# Patient Record
Sex: FEMALE | Race: WHITE | NOT HISPANIC OR LATINO
[De-identification: names, ages, dates, MRNs, and addresses within clinical notes are randomized per-mention and may not be internally consistent; named-entity substitution may affect disease eponyms.]

---

## 2017-09-08 ENCOUNTER — RESULT REVIEW (OUTPATIENT)
Age: 67
End: 2017-09-08

## 2018-04-10 ENCOUNTER — RESULT REVIEW (OUTPATIENT)
Age: 68
End: 2018-04-10

## 2018-07-23 ENCOUNTER — APPOINTMENT (OUTPATIENT)
Dept: GYNECOLOGIC ONCOLOGY | Facility: CLINIC | Age: 68
End: 2018-07-23
Payer: MEDICARE

## 2018-07-23 VITALS
HEIGHT: 63 IN | DIASTOLIC BLOOD PRESSURE: 78 MMHG | SYSTOLIC BLOOD PRESSURE: 122 MMHG | BODY MASS INDEX: 33.66 KG/M2 | WEIGHT: 190 LBS

## 2018-07-23 DIAGNOSIS — N88.2 STRICTURE AND STENOSIS OF CERVIX UTERI: ICD-10-CM

## 2018-07-23 DIAGNOSIS — R93.8 ABNORMAL FINDINGS ON DIAGNOSTIC IMAGING OF OTHER SPECIFIED BODY STRUCTURES: ICD-10-CM

## 2018-07-23 DIAGNOSIS — R73.03 PREDIABETES.: ICD-10-CM

## 2018-07-23 PROCEDURE — 99205 OFFICE O/P NEW HI 60 MIN: CPT

## 2018-07-23 RX ORDER — MAGNESIUM OXIDE 400 MG
400 (241.3 MG) TABLET ORAL
Refills: 0 | Status: ACTIVE | COMMUNITY

## 2018-07-23 RX ORDER — ICOSAPENT ETHYL 1000 MG/1
1 CAPSULE ORAL
Refills: 0 | Status: ACTIVE | COMMUNITY

## 2018-07-29 PROBLEM — N88.2 CERVICAL STENOSIS (UTERINE CERVIX): Status: ACTIVE | Noted: 2018-07-29

## 2018-07-30 ENCOUNTER — OTHER (OUTPATIENT)
Age: 68
End: 2018-07-30

## 2018-08-15 ENCOUNTER — APPOINTMENT (OUTPATIENT)
Dept: ULTRASOUND IMAGING | Facility: HOSPITAL | Age: 68
End: 2018-08-15

## 2018-08-29 ENCOUNTER — OTHER (OUTPATIENT)
Age: 68
End: 2018-08-29

## 2018-09-11 ENCOUNTER — OTHER (OUTPATIENT)
Age: 68
End: 2018-09-11

## 2018-09-12 ENCOUNTER — OTHER (OUTPATIENT)
Age: 68
End: 2018-09-12

## 2018-09-14 ENCOUNTER — OUTPATIENT (OUTPATIENT)
Dept: OUTPATIENT SERVICES | Facility: HOSPITAL | Age: 68
LOS: 1 days | End: 2018-09-14
Payer: MEDICARE

## 2018-09-14 VITALS
SYSTOLIC BLOOD PRESSURE: 119 MMHG | OXYGEN SATURATION: 95 % | HEART RATE: 84 BPM | TEMPERATURE: 99 F | DIASTOLIC BLOOD PRESSURE: 65 MMHG | WEIGHT: 192.9 LBS | HEIGHT: 63 IN | RESPIRATION RATE: 16 BRPM

## 2018-09-14 DIAGNOSIS — Z90.13 ACQUIRED ABSENCE OF BILATERAL BREASTS AND NIPPLES: Chronic | ICD-10-CM

## 2018-09-14 DIAGNOSIS — I10 ESSENTIAL (PRIMARY) HYPERTENSION: ICD-10-CM

## 2018-09-14 DIAGNOSIS — Z98.890 OTHER SPECIFIED POSTPROCEDURAL STATES: Chronic | ICD-10-CM

## 2018-09-14 DIAGNOSIS — N88.2 STRICTURE AND STENOSIS OF CERVIX UTERI: ICD-10-CM

## 2018-09-14 DIAGNOSIS — Z95.0 PRESENCE OF CARDIAC PACEMAKER: ICD-10-CM

## 2018-09-14 DIAGNOSIS — Z01.818 ENCOUNTER FOR OTHER PREPROCEDURAL EXAMINATION: ICD-10-CM

## 2018-09-14 DIAGNOSIS — Z98.891 HISTORY OF UTERINE SCAR FROM PREVIOUS SURGERY: Chronic | ICD-10-CM

## 2018-09-14 DIAGNOSIS — E11.9 TYPE 2 DIABETES MELLITUS WITHOUT COMPLICATIONS: ICD-10-CM

## 2018-09-14 DIAGNOSIS — Z90.722 ACQUIRED ABSENCE OF OVARIES, BILATERAL: Chronic | ICD-10-CM

## 2018-09-14 LAB
ANION GAP SERPL CALC-SCNC: 15 MMOL/L — SIGNIFICANT CHANGE UP (ref 5–17)
BUN SERPL-MCNC: 19 MG/DL — SIGNIFICANT CHANGE UP (ref 7–23)
CALCIUM SERPL-MCNC: 10.1 MG/DL — SIGNIFICANT CHANGE UP (ref 8.4–10.5)
CHLORIDE SERPL-SCNC: 102 MMOL/L — SIGNIFICANT CHANGE UP (ref 96–108)
CO2 SERPL-SCNC: 23 MMOL/L — SIGNIFICANT CHANGE UP (ref 22–31)
CREAT SERPL-MCNC: 0.84 MG/DL — SIGNIFICANT CHANGE UP (ref 0.5–1.3)
GLUCOSE SERPL-MCNC: 113 MG/DL — HIGH (ref 70–99)
HBA1C BLD-MCNC: 6.2 % — HIGH (ref 4–5.6)
HCT VFR BLD CALC: 37 % — SIGNIFICANT CHANGE UP (ref 34.5–45)
HGB BLD-MCNC: 11.7 G/DL — SIGNIFICANT CHANGE UP (ref 11.5–15.5)
MCHC RBC-ENTMCNC: 26.1 PG — LOW (ref 27–34)
MCHC RBC-ENTMCNC: 31.6 GM/DL — LOW (ref 32–36)
MCV RBC AUTO: 82.6 FL — SIGNIFICANT CHANGE UP (ref 80–100)
PLATELET # BLD AUTO: 271 K/UL — SIGNIFICANT CHANGE UP (ref 150–400)
POTASSIUM SERPL-MCNC: 3.9 MMOL/L — SIGNIFICANT CHANGE UP (ref 3.5–5.3)
POTASSIUM SERPL-SCNC: 3.9 MMOL/L — SIGNIFICANT CHANGE UP (ref 3.5–5.3)
RBC # BLD: 4.48 M/UL — SIGNIFICANT CHANGE UP (ref 3.8–5.2)
RBC # FLD: 16.6 % — HIGH (ref 10.3–14.5)
SODIUM SERPL-SCNC: 140 MMOL/L — SIGNIFICANT CHANGE UP (ref 135–145)
WBC # BLD: 10.49 K/UL — SIGNIFICANT CHANGE UP (ref 3.8–10.5)
WBC # FLD AUTO: 10.49 K/UL — SIGNIFICANT CHANGE UP (ref 3.8–10.5)

## 2018-09-14 PROCEDURE — 83036 HEMOGLOBIN GLYCOSYLATED A1C: CPT

## 2018-09-14 PROCEDURE — 85027 COMPLETE CBC AUTOMATED: CPT

## 2018-09-14 PROCEDURE — 80048 BASIC METABOLIC PNL TOTAL CA: CPT

## 2018-09-14 PROCEDURE — G0463: CPT

## 2018-09-14 RX ORDER — LIDOCAINE HCL 20 MG/ML
0.2 VIAL (ML) INJECTION ONCE
Qty: 0 | Refills: 0 | Status: DISCONTINUED | OUTPATIENT
Start: 2018-09-18 | End: 2018-10-03

## 2018-09-14 RX ORDER — SODIUM CHLORIDE 9 MG/ML
3 INJECTION INTRAMUSCULAR; INTRAVENOUS; SUBCUTANEOUS EVERY 8 HOURS
Qty: 0 | Refills: 0 | Status: DISCONTINUED | OUTPATIENT
Start: 2018-09-18 | End: 2018-10-03

## 2018-09-14 NOTE — H&P PST ADULT - PSH
H/O foot surgery  s/p lefy ankle surgery- 2014  H/O mastectomy, bilateral  with TRAM flap reconstruction-2003  H/O:     S/P BSO (bilateral salpingo-oophorectomy)  2004 H/O foot surgery  s/p left ankle surgery- 2014  H/O mastectomy, bilateral  with TRAM flap reconstruction-2003  H/O:     S/P BSO (bilateral salpingo-oophorectomy)  2004

## 2018-09-14 NOTE — H&P PST ADULT - HISTORY OF PRESENT ILLNESS
67 yo female wiitgh 69 yo female with PMH of HTN, breast cancer, cardiac arrythmia (s/p PPM in 01/2011)- T2DM- c/o per vaginal bleeding x 2 months. Had gyn evaluation s/p pelvic sonogram/CT scan abdomen & pelvis revealed stricture & stenosis of cervix uteri- scheduled for D&D, diagnostic hysteroscopy on 09/18/2018 69 yo female with PMH of HTN, breast cancer, cardiac arrythmia (s/p PPM in 01/2011)- T2DM- c/o per vaginal bleeding x 2 months. Had gyn evaluation s/p pelvic sonogram/CT scan abdomen & pelvis revealed stricture & stenosis of cervix uteri- scheduled for D&C, diagnostic hysteroscopy on 09/18/2018

## 2018-09-14 NOTE — H&P PST ADULT - NSANTHOSAYNRD_GEN_A_CORE
No. CESILIA screening performed.  STOP BANG Legend: 0-2 = LOW Risk; 3-4 = INTERMEDIATE Risk; 5-8 = HIGH Risk

## 2018-09-14 NOTE — H&P PST ADULT - PMH
DM (diabetes mellitus)    HTN (hypertension)    Hyperlipidemia, unspecified hyperlipidemia type    Pacemaker  01/19/2011 Breast cancer in female  s/p chemo in 2002  DM (diabetes mellitus)  Type 2 DM  HTN (hypertension)    Hyperlipidemia, unspecified hyperlipidemia type    Pacemaker  01/19/2011-Last interrogation in 07/2018  Stricture and stenosis of cervix uteri

## 2018-09-14 NOTE — H&P PST ADULT - NEGATIVE CARDIOVASCULAR SYMPTOMS
no orthopnea/no peripheral edema/no dyspnea on exertion/no chest pain/no palpitations/no paroxysmal nocturnal dyspnea

## 2018-09-17 ENCOUNTER — TRANSCRIPTION ENCOUNTER (OUTPATIENT)
Age: 68
End: 2018-09-17

## 2018-09-18 ENCOUNTER — RESULT REVIEW (OUTPATIENT)
Age: 68
End: 2018-09-18

## 2018-09-18 ENCOUNTER — OUTPATIENT (OUTPATIENT)
Dept: OUTPATIENT SERVICES | Facility: HOSPITAL | Age: 68
LOS: 1 days | End: 2018-09-18
Payer: MEDICARE

## 2018-09-18 ENCOUNTER — APPOINTMENT (OUTPATIENT)
Dept: GYNECOLOGIC ONCOLOGY | Facility: HOSPITAL | Age: 68
End: 2018-09-18

## 2018-09-18 VITALS
TEMPERATURE: 98 F | OXYGEN SATURATION: 100 % | SYSTOLIC BLOOD PRESSURE: 123 MMHG | HEART RATE: 88 BPM | WEIGHT: 192.9 LBS | RESPIRATION RATE: 20 BRPM | HEIGHT: 63 IN | DIASTOLIC BLOOD PRESSURE: 81 MMHG

## 2018-09-18 VITALS
SYSTOLIC BLOOD PRESSURE: 132 MMHG | DIASTOLIC BLOOD PRESSURE: 68 MMHG | TEMPERATURE: 97 F | RESPIRATION RATE: 16 BRPM | OXYGEN SATURATION: 100 % | HEART RATE: 84 BPM

## 2018-09-18 DIAGNOSIS — Z98.891 HISTORY OF UTERINE SCAR FROM PREVIOUS SURGERY: Chronic | ICD-10-CM

## 2018-09-18 DIAGNOSIS — N88.2 STRICTURE AND STENOSIS OF CERVIX UTERI: ICD-10-CM

## 2018-09-18 DIAGNOSIS — Z98.890 OTHER SPECIFIED POSTPROCEDURAL STATES: Chronic | ICD-10-CM

## 2018-09-18 DIAGNOSIS — Z90.13 ACQUIRED ABSENCE OF BILATERAL BREASTS AND NIPPLES: Chronic | ICD-10-CM

## 2018-09-18 DIAGNOSIS — Z01.818 ENCOUNTER FOR OTHER PREPROCEDURAL EXAMINATION: ICD-10-CM

## 2018-09-18 DIAGNOSIS — Z90.722 ACQUIRED ABSENCE OF OVARIES, BILATERAL: Chronic | ICD-10-CM

## 2018-09-18 LAB — GLUCOSE BLDC GLUCOMTR-MCNC: 107 MG/DL — HIGH (ref 70–99)

## 2018-09-18 PROCEDURE — 88305 TISSUE EXAM BY PATHOLOGIST: CPT

## 2018-09-18 PROCEDURE — 88305 TISSUE EXAM BY PATHOLOGIST: CPT | Mod: 26

## 2018-09-18 PROCEDURE — 82962 GLUCOSE BLOOD TEST: CPT

## 2018-09-18 PROCEDURE — 58558 HYSTEROSCOPY BIOPSY: CPT

## 2018-09-18 PROCEDURE — 76998 US GUIDE INTRAOP: CPT

## 2018-09-18 RX ORDER — OXYCODONE HYDROCHLORIDE 5 MG/1
5 TABLET ORAL ONCE
Qty: 0 | Refills: 0 | Status: DISCONTINUED | OUTPATIENT
Start: 2018-09-18 | End: 2018-09-18

## 2018-09-18 RX ORDER — ONDANSETRON 8 MG/1
4 TABLET, FILM COATED ORAL ONCE
Qty: 0 | Refills: 0 | Status: DISCONTINUED | OUTPATIENT
Start: 2018-09-18 | End: 2018-10-03

## 2018-09-18 RX ORDER — ACETAMINOPHEN 500 MG
1000 TABLET ORAL ONCE
Qty: 0 | Refills: 0 | Status: DISCONTINUED | OUTPATIENT
Start: 2018-09-18 | End: 2018-10-03

## 2018-09-18 RX ORDER — HYDROMORPHONE HYDROCHLORIDE 2 MG/ML
0.25 INJECTION INTRAMUSCULAR; INTRAVENOUS; SUBCUTANEOUS
Qty: 0 | Refills: 0 | Status: DISCONTINUED | OUTPATIENT
Start: 2018-09-18 | End: 2018-09-18

## 2018-09-18 RX ORDER — SODIUM CHLORIDE 9 MG/ML
1000 INJECTION, SOLUTION INTRAVENOUS
Qty: 0 | Refills: 0 | Status: DISCONTINUED | OUTPATIENT
Start: 2018-09-18 | End: 2018-10-03

## 2018-09-18 NOTE — ASU DISCHARGE PLAN (ADULT/PEDIATRIC). - PAIN
Take over the counter ibuprofen and tylenol, as needed, for pain Take over the counter ibuprofen and tylenol, as needed, for pain/n/a

## 2018-09-18 NOTE — ASU PATIENT PROFILE, ADULT - PMH
Breast cancer in female  s/p chemo in 2002  DM (diabetes mellitus)  Type 2 DM  HTN (hypertension)    Hyperlipidemia, unspecified hyperlipidemia type    Pacemaker  01/19/2011-Last interrogation in 07/2018  Stricture and stenosis of cervix uteri

## 2018-09-18 NOTE — ASU PATIENT PROFILE, ADULT - PSH
H/O foot surgery  s/p left ankle surgery- 2014  H/O mastectomy, bilateral  with TRAM flap reconstruction-2003  H/O:     S/P BSO (bilateral salpingo-oophorectomy)  2004

## 2018-09-18 NOTE — BRIEF OPERATIVE NOTE - PROCEDURE
<<-----Click on this checkbox to enter Procedure Hysteroscopy with dilation and curettage of uterus  09/18/2018  with sonogram guidance  Active  AMENZIN

## 2018-09-18 NOTE — ASU PREOP CHECKLIST - TO WHOM
DIXIE Harris from DIXIE Marlow @ [facility] DIXIE Harris from DIXIE Marlow @ 0805; report received from DIXIE Marlow

## 2018-09-18 NOTE — ASU DISCHARGE PLAN (ADULT/PEDIATRIC). - MEDICATION SUMMARY - MEDICATIONS TO TAKE
I will START or STAY ON the medications listed below when I get home from the hospital:    spironolactone 25 mg oral tablet  -- 1  by mouth once a day  -- Indication: For home medication    aspirin 81 mg oral tablet  -- 1 tab(s) by mouth once a day  -- Indication: For home medication    Tylenol 325 mg oral tablet  -- 2 tab(s) by mouth every 4 hours  -- Indication: For pain    ibuprofen 600 mg oral tablet  -- 1 tab(s) by mouth every 6 hours  -- Indication: For pain    ramipril 10 mg oral capsule  -- 10 milligram(s) by mouth 2 times a day  -- Indication: For home medication    metFORMIN 500 mg oral tablet  -- 1 tab(s) by mouth 2 times a day  -- Indication: For home medication    simvastatin 40 mg oral tablet  -- 1 tab(s) by mouth once a day (at bedtime)  -- Indication: For home medication    Zetia 10 mg oral tablet  -- 1 tab(s) by mouth once a day (at bedtime)  -- Indication: For home medication    Vascepa 1 g oral capsule  -- 1  by mouth 2 times a day  -- Indication: For home medication    Bystolic 10 mg oral tablet  -- 10 milligram(s) by mouth once a day (at bedtime)  -- Indication: For home medication    amLODIPine 5 mg oral tablet  -- 1 tab(s) by mouth once a day (at bedtime)  -- Indication: For home medication    magnesium oxide 500 mg oral tablet  -- 1 tab(s) by mouth once a day  -- Indication: For home medication    Vitamin D3 5000 intl units oral tablet  -- 1  by mouth once a week  -- Indication: For home medication    Vitamin B12 1000 mcg oral tablet  -- 1 tab(s) by mouth once a day  -- Indication: For home medication

## 2018-09-18 NOTE — BRIEF OPERATIVE NOTE - POST-OP DX
Cervical stenosis (uterine cervix)  09/18/2018    Active  Kolby Ureña  Thickened endometrium  09/18/2018    Active  Kolby Ureña

## 2018-09-18 NOTE — ASU DISCHARGE PLAN (ADULT/PEDIATRIC). - NOTIFY
Persistent Nausea and Vomiting/Pain not relieved by Medications/Bleeding that does not stop/GYN Fever>100.4/Unable to Urinate/Inability to Tolerate Liquids or Foods

## 2018-09-18 NOTE — BRIEF OPERATIVE NOTE - OPERATION/FINDINGS
nl LGT . small dimple of the os. Sounded under sono guidance to ~ 9+ cm. polypoid tissue anteriorly. otherwise atrophic appearing EM.

## 2018-09-21 LAB — SURGICAL PATHOLOGY STUDY: SIGNIFICANT CHANGE UP

## 2018-10-10 ENCOUNTER — APPOINTMENT (OUTPATIENT)
Dept: GYNECOLOGIC ONCOLOGY | Facility: CLINIC | Age: 68
End: 2018-10-10
Payer: MEDICARE

## 2018-10-10 VITALS
BODY MASS INDEX: 33.66 KG/M2 | HEIGHT: 63 IN | WEIGHT: 190 LBS | SYSTOLIC BLOOD PRESSURE: 125 MMHG | DIASTOLIC BLOOD PRESSURE: 70 MMHG

## 2018-10-10 PROCEDURE — 99214 OFFICE O/P EST MOD 30 MIN: CPT

## 2018-11-20 PROBLEM — N88.2 STRICTURE AND STENOSIS OF CERVIX UTERI: Chronic | Status: ACTIVE | Noted: 2018-09-14

## 2018-11-20 PROBLEM — E78.5 HYPERLIPIDEMIA, UNSPECIFIED: Chronic | Status: ACTIVE | Noted: 2018-09-14

## 2018-11-20 PROBLEM — C50.919 MALIGNANT NEOPLASM OF UNSPECIFIED SITE OF UNSPECIFIED FEMALE BREAST: Chronic | Status: ACTIVE | Noted: 2018-09-14

## 2018-11-26 ENCOUNTER — OTHER (OUTPATIENT)
Age: 68
End: 2018-11-26

## 2018-12-26 ENCOUNTER — OUTPATIENT (OUTPATIENT)
Dept: OUTPATIENT SERVICES | Facility: HOSPITAL | Age: 68
LOS: 1 days | End: 2018-12-26
Payer: MEDICARE

## 2018-12-26 VITALS
TEMPERATURE: 98 F | RESPIRATION RATE: 16 BRPM | SYSTOLIC BLOOD PRESSURE: 130 MMHG | HEART RATE: 98 BPM | WEIGHT: 188.94 LBS | HEIGHT: 63 IN | DIASTOLIC BLOOD PRESSURE: 82 MMHG | OXYGEN SATURATION: 98 %

## 2018-12-26 DIAGNOSIS — Z90.13 ACQUIRED ABSENCE OF BILATERAL BREASTS AND NIPPLES: Chronic | ICD-10-CM

## 2018-12-26 DIAGNOSIS — C54.1 MALIGNANT NEOPLASM OF ENDOMETRIUM: ICD-10-CM

## 2018-12-26 DIAGNOSIS — Z98.891 HISTORY OF UTERINE SCAR FROM PREVIOUS SURGERY: Chronic | ICD-10-CM

## 2018-12-26 DIAGNOSIS — Z98.890 OTHER SPECIFIED POSTPROCEDURAL STATES: Chronic | ICD-10-CM

## 2018-12-26 DIAGNOSIS — Z90.722 ACQUIRED ABSENCE OF OVARIES, BILATERAL: Chronic | ICD-10-CM

## 2018-12-26 LAB
BLD GP AB SCN SERPL QL: NEGATIVE — SIGNIFICANT CHANGE UP
BUN SERPL-MCNC: 21 MG/DL — SIGNIFICANT CHANGE UP (ref 7–23)
CALCIUM SERPL-MCNC: 10.3 MG/DL — SIGNIFICANT CHANGE UP (ref 8.4–10.5)
CHLORIDE SERPL-SCNC: 100 MMOL/L — SIGNIFICANT CHANGE UP (ref 98–107)
CO2 SERPL-SCNC: 25 MMOL/L — SIGNIFICANT CHANGE UP (ref 22–31)
CREAT SERPL-MCNC: 0.82 MG/DL — SIGNIFICANT CHANGE UP (ref 0.5–1.3)
GLUCOSE SERPL-MCNC: 110 MG/DL — HIGH (ref 70–99)
HBA1C BLD-MCNC: 6.1 % — HIGH (ref 4–5.6)
HCT VFR BLD CALC: 40.2 % — SIGNIFICANT CHANGE UP (ref 34.5–45)
HGB BLD-MCNC: 12.7 G/DL — SIGNIFICANT CHANGE UP (ref 11.5–15.5)
MCHC RBC-ENTMCNC: 26.2 PG — LOW (ref 27–34)
MCHC RBC-ENTMCNC: 31.6 % — LOW (ref 32–36)
MCV RBC AUTO: 82.9 FL — SIGNIFICANT CHANGE UP (ref 80–100)
NRBC # FLD: 0 — SIGNIFICANT CHANGE UP
PLATELET # BLD AUTO: 283 K/UL — SIGNIFICANT CHANGE UP (ref 150–400)
PMV BLD: 12 FL — SIGNIFICANT CHANGE UP (ref 7–13)
POTASSIUM SERPL-MCNC: 4 MMOL/L — SIGNIFICANT CHANGE UP (ref 3.5–5.3)
POTASSIUM SERPL-SCNC: 4 MMOL/L — SIGNIFICANT CHANGE UP (ref 3.5–5.3)
RBC # BLD: 4.85 M/UL — SIGNIFICANT CHANGE UP (ref 3.8–5.2)
RBC # FLD: 16 % — HIGH (ref 10.3–14.5)
RH IG SCN BLD-IMP: NEGATIVE — SIGNIFICANT CHANGE UP
SODIUM SERPL-SCNC: 138 MMOL/L — SIGNIFICANT CHANGE UP (ref 135–145)
WBC # BLD: 10.4 K/UL — SIGNIFICANT CHANGE UP (ref 3.8–10.5)
WBC # FLD AUTO: 10.4 K/UL — SIGNIFICANT CHANGE UP (ref 3.8–10.5)

## 2018-12-26 PROCEDURE — 93010 ELECTROCARDIOGRAM REPORT: CPT

## 2018-12-26 RX ORDER — SODIUM CHLORIDE 9 MG/ML
3 INJECTION INTRAMUSCULAR; INTRAVENOUS; SUBCUTANEOUS EVERY 8 HOURS
Qty: 0 | Refills: 0 | Status: DISCONTINUED | OUTPATIENT
Start: 2019-01-10 | End: 2019-01-13

## 2018-12-26 RX ORDER — MAGNESIUM OXIDE 400 MG ORAL TABLET 241.3 MG
1 TABLET ORAL
Qty: 0 | Refills: 0 | COMMUNITY

## 2018-12-26 RX ORDER — SODIUM CHLORIDE 9 MG/ML
1000 INJECTION, SOLUTION INTRAVENOUS
Qty: 0 | Refills: 0 | Status: DISCONTINUED | OUTPATIENT
Start: 2019-01-10 | End: 2019-01-11

## 2018-12-26 RX ORDER — HEPARIN SODIUM 5000 [USP'U]/ML
5000 INJECTION INTRAVENOUS; SUBCUTANEOUS ONCE
Qty: 0 | Refills: 0 | Status: COMPLETED | OUTPATIENT
Start: 2019-01-10 | End: 2019-01-10

## 2018-12-26 RX ORDER — ACETAMINOPHEN 500 MG
2 TABLET ORAL
Qty: 0 | Refills: 0 | COMMUNITY

## 2018-12-26 RX ORDER — ICOSAPENT ETHYL 500 MG/1
1 CAPSULE, LIQUID FILLED ORAL
Qty: 0 | Refills: 0 | COMMUNITY

## 2018-12-26 RX ORDER — IBUPROFEN 200 MG
1 TABLET ORAL
Qty: 0 | Refills: 0 | COMMUNITY

## 2018-12-26 NOTE — H&P PST ADULT - PROBLEM SELECTOR PLAN 1
Scheduled for exploratory laparotomy, total abdominal hysterectomy, bilateral salpingo oophorectomy, pelvic lymph node sampling on 01/10/2019.  Pre-Op instructions provided to patient.   Pepcid for GI prophylaxis provided.   Surgical scrub instructions reviewed and provided to patient.   Patient will obtain medical clearance as per surgeons request- copy requested

## 2018-12-26 NOTE — H&P PST ADULT - ATTENDING COMMENTS
Seen in SDA. Planned procedure discussed. All Q/A. Consent reviewed. Instructions reviewed. Seen in ASU with her . Planned procedure discussed. All Q/A, including recuperative issues. Consent reviewed.

## 2018-12-26 NOTE — H&P PST ADULT - NEGATIVE NEUROLOGICAL SYMPTOMS
no paresthesias/no generalized seizures/no focal seizures/no weakness/no headache/no transient paralysis

## 2018-12-26 NOTE — H&P PST ADULT - NEGATIVE ENMT SYMPTOMS
no vertigo/no nasal congestion/no ear pain/no dysphagia/no tinnitus/no sinus symptoms/no hearing difficulty

## 2018-12-26 NOTE — H&P PST ADULT - ASSESSMENT
69 yo female scheduled for exploratory laparotomy, total abdominal hysterectomy, bilateral salpingo oophorectomy, pelvic lymph node sampling on 01/10/2019.

## 2018-12-26 NOTE — H&P PST ADULT - HISTORY OF PRESENT ILLNESS
67 yo female with PMH of HTN, breast cancer, cardiac arrythmia (s/p PPM in 01/2011)- T2DM- c/o per vaginal bleeding x 2 months s/p pelvic sonogram/CT scan abdomen & pelvis , D&C, diagnostic hysteroscopy now diagnosed with endometrial cancer scheduled for exploratory laparotomy, total abdominal hysterectomy, bilateral salpingo oophorectomy, pelvic lymph node sampling on 01/10/2019. 69 yo female with PMH of HTN, breast cancer, cardiac arrythmia (s/p PPM in 01/2011)- T2DM- c/o per vaginal bleeding x 2 months s/p pelvic sonogram/CT scan abdomen & pelvis , D&C, diagnostic hysteroscopy now diagnosed with endometrial cancer scheduled for exploratory laparotomy, total abdominal hysterectomy, bilateral salpingo oophorectomy (AWM: she is s/p BSO), pelvic lymph node sampling on 01/10/2019.

## 2018-12-26 NOTE — H&P PST ADULT - PMH
Breast cancer in female  s/p chemo in 2002  DM (diabetes mellitus)  Type 2 DM  Endometrial cancer    HTN (hypertension)    Hyperlipidemia, unspecified hyperlipidemia type    Pacemaker  01/19/2011-Last interrogation in 07/2018  Stricture and stenosis of cervix uteri

## 2018-12-26 NOTE — H&P PST ADULT - RS GEN PE MLT RESP DETAILS PC
airway patent/breath sounds equal/good air movement/no wheezes/clear to auscultation bilaterally/respirations non-labored

## 2018-12-26 NOTE — H&P PST ADULT - PROBLEM SELECTOR PLAN 2
FS BS DOS Pt. instructed to hold Metformin the night before and morning of procedure. Accucheck DOS.   OR booking notified of DM2

## 2018-12-26 NOTE — H&P PST ADULT - MUSCULOSKELETAL
details… detailed exam normal strength/no joint swelling/no joint warmth/no calf tenderness/ROM intact

## 2018-12-26 NOTE — H&P PST ADULT - PSH
H/O foot surgery  s/p left ankle surgery- 2014  H/O mastectomy, bilateral  with TRAM flap reconstruction-2003  H/O:     History of dilatation and curettage  2018  S/P BSO (bilateral salpingo-oophorectomy)  2004

## 2018-12-27 ENCOUNTER — FORM ENCOUNTER (OUTPATIENT)
Age: 68
End: 2018-12-27

## 2018-12-28 ENCOUNTER — APPOINTMENT (OUTPATIENT)
Dept: CT IMAGING | Facility: IMAGING CENTER | Age: 68
End: 2018-12-28
Payer: MEDICARE

## 2018-12-28 ENCOUNTER — OUTPATIENT (OUTPATIENT)
Dept: OUTPATIENT SERVICES | Facility: HOSPITAL | Age: 68
LOS: 1 days | End: 2018-12-28
Payer: MEDICARE

## 2018-12-28 DIAGNOSIS — Z90.13 ACQUIRED ABSENCE OF BILATERAL BREASTS AND NIPPLES: Chronic | ICD-10-CM

## 2018-12-28 DIAGNOSIS — C54.1 MALIGNANT NEOPLASM OF ENDOMETRIUM: ICD-10-CM

## 2018-12-28 DIAGNOSIS — Z15.09 GENETIC SUSCEPTIBILITY TO OTHER MALIGNANT NEOPLASM: ICD-10-CM

## 2018-12-28 DIAGNOSIS — Z90.722 ACQUIRED ABSENCE OF OVARIES, BILATERAL: Chronic | ICD-10-CM

## 2018-12-28 DIAGNOSIS — Z98.891 HISTORY OF UTERINE SCAR FROM PREVIOUS SURGERY: Chronic | ICD-10-CM

## 2018-12-28 DIAGNOSIS — Z98.890 OTHER SPECIFIED POSTPROCEDURAL STATES: Chronic | ICD-10-CM

## 2018-12-28 DIAGNOSIS — Z15.01 GENETIC SUSCEPTIBILITY TO MALIGNANT NEOPLASM OF BREAST: ICD-10-CM

## 2018-12-28 PROCEDURE — 74177 CT ABD & PELVIS W/CONTRAST: CPT

## 2018-12-28 PROCEDURE — 74177 CT ABD & PELVIS W/CONTRAST: CPT | Mod: 26

## 2019-01-09 ENCOUNTER — TRANSCRIPTION ENCOUNTER (OUTPATIENT)
Age: 69
End: 2019-01-09

## 2019-01-10 ENCOUNTER — INPATIENT (INPATIENT)
Facility: HOSPITAL | Age: 69
LOS: 2 days | Discharge: HOME | End: 2019-01-13
Attending: OBSTETRICS & GYNECOLOGY | Admitting: OBSTETRICS & GYNECOLOGY
Payer: MEDICARE

## 2019-01-10 ENCOUNTER — APPOINTMENT (OUTPATIENT)
Dept: GYNECOLOGIC ONCOLOGY | Facility: HOSPITAL | Age: 69
End: 2019-01-10

## 2019-01-10 ENCOUNTER — RESULT REVIEW (OUTPATIENT)
Age: 69
End: 2019-01-10

## 2019-01-10 VITALS
DIASTOLIC BLOOD PRESSURE: 85 MMHG | TEMPERATURE: 98 F | RESPIRATION RATE: 16 BRPM | SYSTOLIC BLOOD PRESSURE: 133 MMHG | OXYGEN SATURATION: 98 % | WEIGHT: 188.94 LBS | HEART RATE: 89 BPM | HEIGHT: 63 IN

## 2019-01-10 DIAGNOSIS — Z98.891 HISTORY OF UTERINE SCAR FROM PREVIOUS SURGERY: Chronic | ICD-10-CM

## 2019-01-10 DIAGNOSIS — Z98.890 OTHER SPECIFIED POSTPROCEDURAL STATES: Chronic | ICD-10-CM

## 2019-01-10 DIAGNOSIS — C54.1 MALIGNANT NEOPLASM OF ENDOMETRIUM: ICD-10-CM

## 2019-01-10 DIAGNOSIS — Z90.722 ACQUIRED ABSENCE OF OVARIES, BILATERAL: Chronic | ICD-10-CM

## 2019-01-10 DIAGNOSIS — Z90.13 ACQUIRED ABSENCE OF BILATERAL BREASTS AND NIPPLES: Chronic | ICD-10-CM

## 2019-01-10 LAB
ANION GAP SERPL CALC-SCNC: 13 MEQ/L — SIGNIFICANT CHANGE UP (ref 7–14)
BASOPHILS # BLD AUTO: 0.05 K/UL — SIGNIFICANT CHANGE UP (ref 0–0.2)
BASOPHILS NFR BLD AUTO: 0.2 % — SIGNIFICANT CHANGE UP (ref 0–2)
BUN SERPL-MCNC: 16 MG/DL — SIGNIFICANT CHANGE UP (ref 7–23)
CALCIUM SERPL-MCNC: 8.9 MG/DL — SIGNIFICANT CHANGE UP (ref 8.4–10.5)
CHLORIDE SERPL-SCNC: 106 MMOL/L — SIGNIFICANT CHANGE UP (ref 98–107)
CO2 SERPL-SCNC: 21 MMOL/L — LOW (ref 22–31)
CREAT SERPL-MCNC: 0.76 MG/DL — SIGNIFICANT CHANGE UP (ref 0.5–1.3)
EOSINOPHIL # BLD AUTO: 0 K/UL — SIGNIFICANT CHANGE UP (ref 0–0.5)
EOSINOPHIL NFR BLD AUTO: 0 % — SIGNIFICANT CHANGE UP (ref 0–6)
GLUCOSE BLDC GLUCOMTR-MCNC: 118 MG/DL — HIGH (ref 70–99)
GLUCOSE BLDC GLUCOMTR-MCNC: 146 MG/DL — HIGH (ref 70–99)
GLUCOSE BLDC GLUCOMTR-MCNC: 149 MG/DL — HIGH (ref 70–99)
GLUCOSE SERPL-MCNC: 159 MG/DL — HIGH (ref 70–99)
HCT VFR BLD CALC: 41.4 % — SIGNIFICANT CHANGE UP (ref 34.5–45)
HGB BLD-MCNC: 13.2 G/DL — SIGNIFICANT CHANGE UP (ref 11.5–15.5)
IMM GRANULOCYTES NFR BLD AUTO: 0.6 % — SIGNIFICANT CHANGE UP (ref 0–1.5)
LYMPHOCYTES # BLD AUTO: 0.85 K/UL — LOW (ref 1–3.3)
LYMPHOCYTES # BLD AUTO: 3.9 % — LOW (ref 13–44)
MAGNESIUM SERPL-MCNC: 1.6 MG/DL — SIGNIFICANT CHANGE UP (ref 1.6–2.6)
MCHC RBC-ENTMCNC: 26.4 PG — LOW (ref 27–34)
MCHC RBC-ENTMCNC: 31.9 % — LOW (ref 32–36)
MCV RBC AUTO: 82.8 FL — SIGNIFICANT CHANGE UP (ref 80–100)
MONOCYTES # BLD AUTO: 1.1 K/UL — HIGH (ref 0–0.9)
MONOCYTES NFR BLD AUTO: 5 % — SIGNIFICANT CHANGE UP (ref 2–14)
NEUTROPHILS # BLD AUTO: 19.91 K/UL — HIGH (ref 1.8–7.4)
NEUTROPHILS NFR BLD AUTO: 90.3 % — HIGH (ref 43–77)
NRBC # FLD: 0 K/UL — LOW (ref 25–125)
PHOSPHATE SERPL-MCNC: 3.2 MG/DL — SIGNIFICANT CHANGE UP (ref 2.5–4.5)
PLATELET # BLD AUTO: 267 K/UL — SIGNIFICANT CHANGE UP (ref 150–400)
PMV BLD: 11.9 FL — SIGNIFICANT CHANGE UP (ref 7–13)
POTASSIUM SERPL-MCNC: 4.3 MMOL/L — SIGNIFICANT CHANGE UP (ref 3.5–5.3)
POTASSIUM SERPL-SCNC: 4.3 MMOL/L — SIGNIFICANT CHANGE UP (ref 3.5–5.3)
RBC # BLD: 5 M/UL — SIGNIFICANT CHANGE UP (ref 3.8–5.2)
RBC # FLD: 16.4 % — HIGH (ref 10.3–14.5)
RH IG SCN BLD-IMP: NEGATIVE — SIGNIFICANT CHANGE UP
SODIUM SERPL-SCNC: 140 MMOL/L — SIGNIFICANT CHANGE UP (ref 135–145)
WBC # BLD: 22.05 K/UL — HIGH (ref 3.8–10.5)
WBC # FLD AUTO: 22.05 K/UL — HIGH (ref 3.8–10.5)

## 2019-01-10 PROCEDURE — 38562 REMOVAL PELVIC LYMPH NODES: CPT

## 2019-01-10 PROCEDURE — 88305 TISSUE EXAM BY PATHOLOGIST: CPT | Mod: 26,59

## 2019-01-10 PROCEDURE — 88309 TISSUE EXAM BY PATHOLOGIST: CPT | Mod: 26

## 2019-01-10 PROCEDURE — 88331 PATH CONSLTJ SURG 1 BLK 1SPC: CPT | Mod: 26

## 2019-01-10 PROCEDURE — 88307 TISSUE EXAM BY PATHOLOGIST: CPT | Mod: 26

## 2019-01-10 PROCEDURE — 49203: CPT

## 2019-01-10 PROCEDURE — 58150 TOTAL HYSTERECTOMY: CPT

## 2019-01-10 PROCEDURE — 88341 IMHCHEM/IMCYTCHM EA ADD ANTB: CPT | Mod: 26

## 2019-01-10 PROCEDURE — 88342 IMHCHEM/IMCYTCHM 1ST ANTB: CPT | Mod: 26

## 2019-01-10 PROCEDURE — 88112 CYTOPATH CELL ENHANCE TECH: CPT | Mod: 26

## 2019-01-10 PROCEDURE — 88305 TISSUE EXAM BY PATHOLOGIST: CPT | Mod: 26

## 2019-01-10 RX ORDER — FENTANYL CITRATE 50 UG/ML
25 INJECTION INTRAVENOUS
Qty: 0 | Refills: 0 | Status: DISCONTINUED | OUTPATIENT
Start: 2019-01-10 | End: 2019-01-10

## 2019-01-10 RX ORDER — DOCUSATE SODIUM 100 MG
100 CAPSULE ORAL THREE TIMES A DAY
Qty: 0 | Refills: 0 | Status: DISCONTINUED | OUTPATIENT
Start: 2019-01-10 | End: 2019-01-13

## 2019-01-10 RX ORDER — METOPROLOL TARTRATE 50 MG
5 TABLET ORAL EVERY 6 HOURS
Qty: 0 | Refills: 0 | Status: DISCONTINUED | OUTPATIENT
Start: 2019-01-10 | End: 2019-01-12

## 2019-01-10 RX ORDER — DEXTROSE 50 % IN WATER 50 %
15 SYRINGE (ML) INTRAVENOUS ONCE
Qty: 0 | Refills: 0 | Status: DISCONTINUED | OUTPATIENT
Start: 2019-01-10 | End: 2019-01-13

## 2019-01-10 RX ORDER — GLUCAGON INJECTION, SOLUTION 0.5 MG/.1ML
1 INJECTION, SOLUTION SUBCUTANEOUS ONCE
Qty: 0 | Refills: 0 | Status: DISCONTINUED | OUTPATIENT
Start: 2019-01-10 | End: 2019-01-13

## 2019-01-10 RX ORDER — INSULIN LISPRO 100/ML
VIAL (ML) SUBCUTANEOUS
Qty: 0 | Refills: 0 | Status: DISCONTINUED | OUTPATIENT
Start: 2019-01-10 | End: 2019-01-13

## 2019-01-10 RX ORDER — METFORMIN HYDROCHLORIDE 850 MG/1
1 TABLET ORAL
Qty: 0 | Refills: 0 | COMMUNITY

## 2019-01-10 RX ORDER — EZETIMIBE 10 MG/1
1 TABLET ORAL
Qty: 0 | Refills: 0 | COMMUNITY

## 2019-01-10 RX ORDER — AMLODIPINE BESYLATE 2.5 MG/1
1 TABLET ORAL
Qty: 0 | Refills: 0 | COMMUNITY

## 2019-01-10 RX ORDER — INSULIN LISPRO 100/ML
VIAL (ML) SUBCUTANEOUS AT BEDTIME
Qty: 0 | Refills: 0 | Status: DISCONTINUED | OUTPATIENT
Start: 2019-01-10 | End: 2019-01-13

## 2019-01-10 RX ORDER — HEPARIN SODIUM 5000 [USP'U]/ML
5000 INJECTION INTRAVENOUS; SUBCUTANEOUS EVERY 8 HOURS
Qty: 0 | Refills: 0 | Status: DISCONTINUED | OUTPATIENT
Start: 2019-01-10 | End: 2019-01-11

## 2019-01-10 RX ORDER — HYDROMORPHONE HYDROCHLORIDE 2 MG/ML
0.5 INJECTION INTRAMUSCULAR; INTRAVENOUS; SUBCUTANEOUS
Qty: 0 | Refills: 0 | Status: DISCONTINUED | OUTPATIENT
Start: 2019-01-10 | End: 2019-01-10

## 2019-01-10 RX ORDER — CHLORHEXIDINE GLUCONATE 213 G/1000ML
1 SOLUTION TOPICAL ONCE
Qty: 0 | Refills: 0 | Status: DISCONTINUED | OUTPATIENT
Start: 2019-01-10 | End: 2019-01-13

## 2019-01-10 RX ORDER — DEXTROSE 50 % IN WATER 50 %
12.5 SYRINGE (ML) INTRAVENOUS ONCE
Qty: 0 | Refills: 0 | Status: DISCONTINUED | OUTPATIENT
Start: 2019-01-10 | End: 2019-01-13

## 2019-01-10 RX ORDER — SIMVASTATIN 20 MG/1
1 TABLET, FILM COATED ORAL
Qty: 0 | Refills: 0 | COMMUNITY

## 2019-01-10 RX ORDER — DEXTROSE 50 % IN WATER 50 %
25 SYRINGE (ML) INTRAVENOUS ONCE
Qty: 0 | Refills: 0 | Status: DISCONTINUED | OUTPATIENT
Start: 2019-01-10 | End: 2019-01-13

## 2019-01-10 RX ORDER — ACETAMINOPHEN 500 MG
1000 TABLET ORAL ONCE
Qty: 0 | Refills: 0 | Status: COMPLETED | OUTPATIENT
Start: 2019-01-11 | End: 2019-01-10

## 2019-01-10 RX ORDER — ONDANSETRON 8 MG/1
4 TABLET, FILM COATED ORAL EVERY 6 HOURS
Qty: 0 | Refills: 0 | Status: DISCONTINUED | OUTPATIENT
Start: 2019-01-10 | End: 2019-01-13

## 2019-01-10 RX ORDER — SODIUM CHLORIDE 9 MG/ML
1000 INJECTION, SOLUTION INTRAVENOUS
Qty: 0 | Refills: 0 | Status: DISCONTINUED | OUTPATIENT
Start: 2019-01-10 | End: 2019-01-11

## 2019-01-10 RX ORDER — MAGNESIUM OXIDE 400 MG ORAL TABLET 241.3 MG
1 TABLET ORAL
Qty: 0 | Refills: 0 | COMMUNITY

## 2019-01-10 RX ORDER — ACETAMINOPHEN 500 MG
1000 TABLET ORAL ONCE
Qty: 0 | Refills: 0 | Status: COMPLETED | OUTPATIENT
Start: 2019-01-10 | End: 2019-01-10

## 2019-01-10 RX ORDER — SODIUM CHLORIDE 9 MG/ML
1000 INJECTION, SOLUTION INTRAVENOUS
Qty: 0 | Refills: 0 | Status: DISCONTINUED | OUTPATIENT
Start: 2019-01-10 | End: 2019-01-13

## 2019-01-10 RX ORDER — SPIRONOLACTONE 25 MG/1
1 TABLET, FILM COATED ORAL
Qty: 0 | Refills: 0 | COMMUNITY

## 2019-01-10 RX ORDER — ACETAMINOPHEN 500 MG
1000 TABLET ORAL ONCE
Qty: 0 | Refills: 0 | Status: COMPLETED | OUTPATIENT
Start: 2019-01-11 | End: 2019-01-11

## 2019-01-10 RX ORDER — METOCLOPRAMIDE HCL 10 MG
10 TABLET ORAL ONCE
Qty: 0 | Refills: 0 | Status: DISCONTINUED | OUTPATIENT
Start: 2019-01-10 | End: 2019-01-10

## 2019-01-10 RX ORDER — NALOXONE HYDROCHLORIDE 4 MG/.1ML
0.1 SPRAY NASAL
Qty: 0 | Refills: 0 | Status: DISCONTINUED | OUTPATIENT
Start: 2019-01-10 | End: 2019-01-13

## 2019-01-10 RX ORDER — HYDROMORPHONE HYDROCHLORIDE 2 MG/ML
30 INJECTION INTRAMUSCULAR; INTRAVENOUS; SUBCUTANEOUS
Qty: 0 | Refills: 0 | Status: DISCONTINUED | OUTPATIENT
Start: 2019-01-10 | End: 2019-01-12

## 2019-01-10 RX ORDER — DIPHENHYDRAMINE HCL 50 MG
25 CAPSULE ORAL EVERY 4 HOURS
Qty: 0 | Refills: 0 | Status: DISCONTINUED | OUTPATIENT
Start: 2019-01-10 | End: 2019-01-13

## 2019-01-10 RX ORDER — PREGABALIN 225 MG/1
1 CAPSULE ORAL
Qty: 0 | Refills: 0 | COMMUNITY

## 2019-01-10 RX ORDER — ICOSAPENT ETHYL 500 MG/1
1 CAPSULE, LIQUID FILLED ORAL
Qty: 0 | Refills: 0 | COMMUNITY

## 2019-01-10 RX ORDER — CHOLECALCIFEROL (VITAMIN D3) 125 MCG
1 CAPSULE ORAL
Qty: 0 | Refills: 0 | COMMUNITY

## 2019-01-10 RX ADMIN — SODIUM CHLORIDE 30 MILLILITER(S): 9 INJECTION, SOLUTION INTRAVENOUS at 07:10

## 2019-01-10 RX ADMIN — HEPARIN SODIUM 5000 UNIT(S): 5000 INJECTION INTRAVENOUS; SUBCUTANEOUS at 07:10

## 2019-01-10 RX ADMIN — HYDROMORPHONE HYDROCHLORIDE 30 MILLILITER(S): 2 INJECTION INTRAMUSCULAR; INTRAVENOUS; SUBCUTANEOUS at 21:17

## 2019-01-10 RX ADMIN — Medication 400 MILLIGRAM(S): at 18:39

## 2019-01-10 RX ADMIN — SODIUM CHLORIDE 3 MILLILITER(S): 9 INJECTION INTRAMUSCULAR; INTRAVENOUS; SUBCUTANEOUS at 22:13

## 2019-01-10 RX ADMIN — HEPARIN SODIUM 5000 UNIT(S): 5000 INJECTION INTRAVENOUS; SUBCUTANEOUS at 22:31

## 2019-01-10 RX ADMIN — Medication 400 MILLIGRAM(S): at 23:38

## 2019-01-10 RX ADMIN — HYDROMORPHONE HYDROCHLORIDE 30 MILLILITER(S): 2 INJECTION INTRAMUSCULAR; INTRAVENOUS; SUBCUTANEOUS at 15:17

## 2019-01-10 RX ADMIN — SODIUM CHLORIDE 125 MILLILITER(S): 9 INJECTION, SOLUTION INTRAVENOUS at 21:29

## 2019-01-10 RX ADMIN — SODIUM CHLORIDE 3 MILLILITER(S): 9 INJECTION INTRAMUSCULAR; INTRAVENOUS; SUBCUTANEOUS at 20:57

## 2019-01-10 NOTE — BRIEF OPERATIVE NOTE - OPERATION/FINDINGS
Elevation of abdominal flap with closure s/p total abdominal wall hysterectomy, BL salpingo oophorectomy, lymph node sampling with gyn/onc

## 2019-01-10 NOTE — BRIEF OPERATIVE NOTE - PROCEDURE
<<-----Click on this checkbox to enter Procedure Abdominal wall closure  01/10/2019  Elvation of abdominal flap with closure s/p total abdominal wall hysterectomy, BL salpingo oophorectomy, lymph node sampling with gyn/onc  Active  [email or web address]

## 2019-01-10 NOTE — CHART NOTE - NSCHARTNOTEFT_GEN_A_CORE
PA POST OP NOTE:       SUBJECTIVE:  Patient seen and examined at bedside. Patient is awake and resting comfortably. Reports pain is under good control at this time. Denies c/o nausea, vomiting, sob, cp or palpitations.    Vital Signs Last 24 Hrs  T(C): 36.2 (10 Antonio 2019 14:40), Max: 36.5 (10 Antonio 2019 06:34)  T(F): 97.2 (10 Antonio 2019 14:40), Max: 97.7 (10 Antonio 2019 06:34)  HR: 79 (10 Antonio 2019 15:30) (79 - 89)  BP: 127/69 (10 Antonio 2019 15:30) (126/65 - 140/65)  BP(mean): 82 (10 Antonio 2019 15:30) (74 - 84)  RR: 16 (10 Antonio 2019 15:30) (16 - 22)  SpO2: 96% (10 Antonio 2019 15:30) (91% - 98%)      PHYSICAL EXAM:    LUNGS: Clear to auscultation bilaterally; No wheezing.  HEART: Regular, rate and rhythm; No murmurs.  ABDOMEN: Soft, decreased BS, nondistended and appropriately tender on palpation. Abdominal binder in place.  INCISION: Dressing intact, clean and dry. ANTHONY drain with serosanguinous drainage noted.  EXTREMITIES: No swelling or calf tenderness bilaterally. Venodynes in place.  ANTUNEZ: Urine clear.  LABS:                          13.2   22.05 )-----------( 267      ( 10 Antonio 2019 15:30 )             41.4     01-10    140  |  106  |  16  ----------------------------<  159<H>  4.3   |  21<L>  |  0.76    Ca    8.9      10 Antonio 2019 15:30  Phos  3.2     01-10  Mg     1.6     01-10      MEDICATIONS  (STANDING):  acetaminophen  IVPB .. 1000 milliGRAM(s) IV Intermittent once  acetaminophen  IVPB .. 1000 milliGRAM(s) IV Intermittent once  acetaminophen  IVPB .. 1000 milliGRAM(s) IV Intermittent once  acetaminophen  IVPB .. 1000 milliGRAM(s) IV Intermittent once  chlorhexidine 2% Cloths 1 Application(s) Topical once  dextrose 5%. 1000 milliLiter(s) (50 mL/Hr) IV Continuous <Continuous>  dextrose 50% Injectable 12.5 Gram(s) IV Push once  dextrose 50% Injectable 25 Gram(s) IV Push once  dextrose 50% Injectable 25 Gram(s) IV Push once  heparin  Injectable 5000 Unit(s) SubCutaneous every 8 hours  HYDROmorphone PCA (1 mG/mL) 30 milliLiter(s) PCA Continuous PCA Continuous  insulin lispro (HumaLOG) corrective regimen sliding scale   SubCutaneous three times a day before meals  insulin lispro (HumaLOG) corrective regimen sliding scale   SubCutaneous at bedtime  lactated ringers. 1000 milliLiter(s) (30 mL/Hr) IV Continuous <Continuous>  lactated ringers. 1000 milliLiter(s) (125 mL/Hr) IV Continuous <Continuous>  metoprolol tartrate Injectable 5 milliGRAM(s) IV Push every 6 hours  sodium chloride 0.9% lock flush 3 milliLiter(s) IV Push every 8 hours    MEDICATIONS  (PRN):  dextrose 40% Gel 15 Gram(s) Oral once PRN Blood Glucose LESS THAN 70 milliGRAM(s)/deciliter  diphenhydrAMINE 25 milliGRAM(s) Oral every 4 hours PRN Pruritus  docusate sodium 100 milliGRAM(s) Oral three times a day PRN Stool Softening  fentaNYL    Injectable 25 MICROGram(s) IV Push every 5 minutes PRN Moderate Pain (4 - 6)  glucagon  Injectable 1 milliGRAM(s) IntraMuscular once PRN Glucose LESS THAN 70 milligrams/deciliter  HYDROmorphone  Injectable 0.5 milliGRAM(s) IV Push every 10 minutes PRN Severe Pain (7 - 10)  metoclopramide Injectable 10 milliGRAM(s) IV Push once PRN Nausea and/or Vomiting  naloxone Injectable 0.1 milliGRAM(s) IV Push every 3 minutes PRN For ANY of the following changes in patient status:  A. RR LESS THAN 10 breaths per minute, B. Oxygen saturation LESS THAN 90%, C. Sedation score of 6  ondansetron Injectable 4 milliGRAM(s) IV Push every 6 hours PRN Nausea      ASSESMENT/PLAN: 69y/o POD#0  from Exlap CHRISS,BSO, PLND and Plastics closure in stable condition.    1. Clear diet as tolerate. Advance in AM.  2. IVF: RL @125cc/hr.  3. Activity: Out of bed with assist.  4. Vital Signs Q routine.  5. PCA as per Anesthesia and IV tylenol X 24 hours.  6. LABS: PACU labs stable. CBC+BMP in AM.  7. Antunez to gravity. Remove antunez Sat 1/12.  9. Continue Heparin SQ  and Venodynes for DVT prophylaxis.  10. Admit to floor.  11. Continue routine post op care.

## 2019-01-11 ENCOUNTER — TRANSCRIPTION ENCOUNTER (OUTPATIENT)
Age: 69
End: 2019-01-11

## 2019-01-11 LAB
ANION GAP SERPL CALC-SCNC: 12 MEQ/L — SIGNIFICANT CHANGE UP (ref 7–14)
BASOPHILS # BLD AUTO: 0.03 K/UL — SIGNIFICANT CHANGE UP (ref 0–0.2)
BASOPHILS NFR BLD AUTO: 0.2 % — SIGNIFICANT CHANGE UP (ref 0–2)
BUN SERPL-MCNC: 13 MG/DL — SIGNIFICANT CHANGE UP (ref 7–23)
CALCIUM SERPL-MCNC: 9.2 MG/DL — SIGNIFICANT CHANGE UP (ref 8.4–10.5)
CHLORIDE SERPL-SCNC: 106 MMOL/L — SIGNIFICANT CHANGE UP (ref 98–107)
CO2 SERPL-SCNC: 18 MMOL/L — LOW (ref 22–31)
CREAT SERPL-MCNC: 0.68 MG/DL — SIGNIFICANT CHANGE UP (ref 0.5–1.3)
EOSINOPHIL # BLD AUTO: 0.04 K/UL — SIGNIFICANT CHANGE UP (ref 0–0.5)
EOSINOPHIL NFR BLD AUTO: 0.3 % — SIGNIFICANT CHANGE UP (ref 0–6)
GLUCOSE BLDC GLUCOMTR-MCNC: 123 MG/DL — HIGH (ref 70–99)
GLUCOSE BLDC GLUCOMTR-MCNC: 123 MG/DL — HIGH (ref 70–99)
GLUCOSE BLDC GLUCOMTR-MCNC: 124 MG/DL — HIGH (ref 70–99)
GLUCOSE BLDC GLUCOMTR-MCNC: 126 MG/DL — HIGH (ref 70–99)
GLUCOSE BLDC GLUCOMTR-MCNC: 129 MG/DL — HIGH (ref 70–99)
GLUCOSE SERPL-MCNC: 127 MG/DL — HIGH (ref 70–99)
HCT VFR BLD CALC: 34.6 % — SIGNIFICANT CHANGE UP (ref 34.5–45)
HGB BLD-MCNC: 11 G/DL — LOW (ref 11.5–15.5)
IMM GRANULOCYTES NFR BLD AUTO: 0.6 % — SIGNIFICANT CHANGE UP (ref 0–1.5)
LYMPHOCYTES # BLD AUTO: 1.84 K/UL — SIGNIFICANT CHANGE UP (ref 1–3.3)
LYMPHOCYTES # BLD AUTO: 11.6 % — LOW (ref 13–44)
MAGNESIUM SERPL-MCNC: 1.7 MG/DL — SIGNIFICANT CHANGE UP (ref 1.6–2.6)
MCHC RBC-ENTMCNC: 26 PG — LOW (ref 27–34)
MCHC RBC-ENTMCNC: 31.8 % — LOW (ref 32–36)
MCV RBC AUTO: 81.8 FL — SIGNIFICANT CHANGE UP (ref 80–100)
MONOCYTES # BLD AUTO: 1.21 K/UL — HIGH (ref 0–0.9)
MONOCYTES NFR BLD AUTO: 7.6 % — SIGNIFICANT CHANGE UP (ref 2–14)
NEUTROPHILS # BLD AUTO: 12.62 K/UL — HIGH (ref 1.8–7.4)
NEUTROPHILS NFR BLD AUTO: 79.7 % — HIGH (ref 43–77)
NON-GYNECOLOGICAL CYTOLOGY STUDY: SIGNIFICANT CHANGE UP
NRBC # FLD: 0 K/UL — LOW (ref 25–125)
PHOSPHATE SERPL-MCNC: 3.1 MG/DL — SIGNIFICANT CHANGE UP (ref 2.5–4.5)
PLATELET # BLD AUTO: 265 K/UL — SIGNIFICANT CHANGE UP (ref 150–400)
PMV BLD: 12.4 FL — SIGNIFICANT CHANGE UP (ref 7–13)
POTASSIUM SERPL-MCNC: 4.4 MMOL/L — SIGNIFICANT CHANGE UP (ref 3.5–5.3)
POTASSIUM SERPL-SCNC: 4.4 MMOL/L — SIGNIFICANT CHANGE UP (ref 3.5–5.3)
RBC # BLD: 4.23 M/UL — SIGNIFICANT CHANGE UP (ref 3.8–5.2)
RBC # FLD: 16.5 % — HIGH (ref 10.3–14.5)
SODIUM SERPL-SCNC: 136 MMOL/L — SIGNIFICANT CHANGE UP (ref 135–145)
WBC # BLD: 15.84 K/UL — HIGH (ref 3.8–10.5)
WBC # FLD AUTO: 15.84 K/UL — HIGH (ref 3.8–10.5)

## 2019-01-11 RX ORDER — ENOXAPARIN SODIUM 100 MG/ML
40 INJECTION SUBCUTANEOUS
Qty: 28 | Refills: 0
Start: 2019-01-11 | End: 2019-02-07

## 2019-01-11 RX ORDER — ENOXAPARIN SODIUM 100 MG/ML
40 INJECTION SUBCUTANEOUS DAILY
Qty: 0 | Refills: 0 | Status: DISCONTINUED | OUTPATIENT
Start: 2019-01-11 | End: 2019-01-13

## 2019-01-11 RX ORDER — ACETAMINOPHEN 500 MG
1000 TABLET ORAL ONCE
Qty: 0 | Refills: 0 | Status: COMPLETED | OUTPATIENT
Start: 2019-01-11 | End: 2019-01-11

## 2019-01-11 RX ORDER — SODIUM CHLORIDE 9 MG/ML
1000 INJECTION, SOLUTION INTRAVENOUS
Qty: 0 | Refills: 0 | Status: DISCONTINUED | OUTPATIENT
Start: 2019-01-11 | End: 2019-01-12

## 2019-01-11 RX ORDER — BENZOCAINE AND MENTHOL 5; 1 G/100ML; G/100ML
1 LIQUID ORAL EVERY 4 HOURS
Qty: 0 | Refills: 0 | Status: DISCONTINUED | OUTPATIENT
Start: 2019-01-11 | End: 2019-01-13

## 2019-01-11 RX ADMIN — BENZOCAINE AND MENTHOL 1 LOZENGE: 5; 1 LIQUID ORAL at 10:05

## 2019-01-11 RX ADMIN — SODIUM CHLORIDE 125 MILLILITER(S): 9 INJECTION, SOLUTION INTRAVENOUS at 03:23

## 2019-01-11 RX ADMIN — Medication 5 MILLIGRAM(S): at 19:03

## 2019-01-11 RX ADMIN — SODIUM CHLORIDE 3 MILLILITER(S): 9 INJECTION INTRAMUSCULAR; INTRAVENOUS; SUBCUTANEOUS at 14:00

## 2019-01-11 RX ADMIN — SODIUM CHLORIDE 3 MILLILITER(S): 9 INJECTION INTRAMUSCULAR; INTRAVENOUS; SUBCUTANEOUS at 06:44

## 2019-01-11 RX ADMIN — Medication 400 MILLIGRAM(S): at 20:08

## 2019-01-11 RX ADMIN — SODIUM CHLORIDE 3 MILLILITER(S): 9 INJECTION INTRAMUSCULAR; INTRAVENOUS; SUBCUTANEOUS at 22:40

## 2019-01-11 RX ADMIN — HYDROMORPHONE HYDROCHLORIDE 30 MILLILITER(S): 2 INJECTION INTRAMUSCULAR; INTRAVENOUS; SUBCUTANEOUS at 19:57

## 2019-01-11 RX ADMIN — Medication 1000 MILLIGRAM(S): at 14:00

## 2019-01-11 RX ADMIN — Medication 1000 MILLIGRAM(S): at 07:00

## 2019-01-11 RX ADMIN — Medication 400 MILLIGRAM(S): at 06:34

## 2019-01-11 RX ADMIN — SODIUM CHLORIDE 50 MILLILITER(S): 9 INJECTION, SOLUTION INTRAVENOUS at 19:03

## 2019-01-11 RX ADMIN — Medication 1000 MILLIGRAM(S): at 20:38

## 2019-01-11 RX ADMIN — ENOXAPARIN SODIUM 40 MILLIGRAM(S): 100 INJECTION SUBCUTANEOUS at 07:34

## 2019-01-11 RX ADMIN — Medication 5 MILLIGRAM(S): at 13:32

## 2019-01-11 RX ADMIN — SODIUM CHLORIDE 50 MILLILITER(S): 9 INJECTION, SOLUTION INTRAVENOUS at 07:31

## 2019-01-11 RX ADMIN — Medication 1000 MILLIGRAM(S): at 00:00

## 2019-01-11 RX ADMIN — HYDROMORPHONE HYDROCHLORIDE 30 MILLILITER(S): 2 INJECTION INTRAMUSCULAR; INTRAVENOUS; SUBCUTANEOUS at 07:33

## 2019-01-11 RX ADMIN — Medication 400 MILLIGRAM(S): at 13:32

## 2019-01-11 NOTE — DISCHARGE NOTE ADULT - HOSPITAL COURSE
67 yo F ex lap, CHRISS, PLND, elevation of abdominal flap by plastic surgery due to endometrial cancer. See operative report for full details. Hct: 40.2->41.4->34.6->33.1     POD#0: pt was transferred from PACU to the floor with a clear diet, PCA and IV Tylenol for pain control  POD#1: Pt was transitioned to regular diet, tolerating well. Pt ambulating on floor. PCA providing analgesia.  POD#2: antunez catheter was discontinued and pt was able to spontaneously void.   POD#3:On the day of discharge, pt met all post operative milestones.  She was voiding spontaneously, tolerating a regular diet. Pt has a scheduled f/u apt with Dr Ureña on 1/28 at 12pm 67 yo F Ex lap, CHRISS, PLND, elevation of abdominal flap by plastic surgery due to endometrial cancer. See operative report for full details.  Pt was extubated in the OR and transferred to PACU in a hemodynamically stable condition with PCA for pain control, SQ Heparin for DVT prophylaxis.  On POD#1,  pt was out of bed to chair.  Pt's pain was controlled on PCA, which was discontinued by end of POD#1 and pt was placed on PO meds.  Pt was transitioned from Heparin to Lovenox for continued DVT prophylaxis. Pt vital signs remained stable throughout post-operative course.  Pt tolerated reg diet.  On POD#2, pt was ambulating at will. Her Nix catheter was discontinued and she was able to void spontaneously.  Pt continued on prophylactic dose of Lovenox for hypercoagulable state, which pt will continue at home.  She was tolerating PO Meds and tolerating reg diet. Pt continued on her home medications.  On POD #3 patient was discharged to home in clinically stable condition. Vital signs were stable as well and lab values are stated below.  Pt has had an uneventful postoperative course.   Patient to have close follow up with Dr. Ureña. Pt has a follow up appointment for  January 28, 2019 at 12noon.  Upon discharge on POD# 3, the patient is ambulating and voiding spontaneously, tolerating oral intake, pain was well controlled with oral medication, and vital signs were stable. All home care has been explained to pt and family.  Pt understands home instructions and all questions have been answered regarding home care and discharge.  Medications sent to pharmacy of pt choice.  LABS:  WBC: 10.4->22->15.8->11.3->9.14       Hct: 40.2->41.4->34.6->33.1->33.5           Cr: 0.82->0.76->0.68->0.74->0.70         Phos: 3.2->3.1->1.7->Kphos->2.3 69 yo F Ex lap, CHRISS, PLND, elevation of abdominal flap by plastic surgery due to endometrial cancer. See operative report for full details.  Pt was extubated in the OR and transferred to PACU in a hemodynamically stable condition with PCA for pain control, SQ Heparin for DVT prophylaxis.  On POD#1,  pt was out of bed to chair.  Pt's pain was controlled on PCA, which was discontinued by end of POD#1 and pt was placed on PO meds.  Pt was transitioned from Heparin to Lovenox for continued DVT prophylaxis. Pt vital signs remained stable throughout post-operative course.  Pt tolerated reg diet.  On POD#2, pt was ambulating at will. Her Nix catheter was discontinued and she was able to void spontaneously.  Pt continued on prophylactic dose of Lovenox for hypercoagulable state, which pt will continue at home.  She was tolerating PO Meds and tolerating reg diet. Pt continued on her home medications.  Pt has 2 ANTHONY drains, which she will go come with and pt was given support for drain care.  On POD #3 patient was discharged to home in clinically stable condition. Vital signs were stable as well and lab values are stated below.  Pt has had an uneventful postoperative course.   Patient to have close follow up with Dr. Urñea. Pt has a follow up appointment for  January 28, 2019 at 12noon.  Upon discharge on POD# 3, the patient is ambulating and voiding spontaneously, tolerating oral intake, pain was well controlled with oral medication, and vital signs were stable. All home care has been explained to pt and family.  Pt understands home instructions and all questions have been answered regarding home care including drains and discharge.  Medications sent to pharmacy of pt choice.  LABS:  WBC: 10.4->22->15.8->11.3->9.14       Hct: 40.2->41.4->34.6->33.1->33.5           Cr: 0.82->0.76->0.68->0.74->0.70         Phos: 3.2->3.1->1.7->Kphos->2.3 67 yo F Ex lap, CHRISS, PLND, elevation of abdominal flap by plastic surgery due to endometrial cancer. See operative report for full details.  Pt was extubated in the OR and transferred to PACU in a hemodynamically stable condition with PCA for pain control, SQ Heparin for DVT prophylaxis.  On POD#1,  pt was out of bed to chair.  Pt's pain was controlled on PCA, which was discontinued by end of POD#1 and pt was placed on PO meds.  Pt was transitioned from Heparin to Lovenox for continued DVT prophylaxis. Pt vital signs remained stable throughout post-operative course.  Pt tolerated reg diet.  On POD#2, pt was ambulating at will. Her Nix catheter was discontinued and she was able to void spontaneously.  Pt continued on prophylactic dose of Lovenox for hypercoagulable state, which pt will continue at home.  She was tolerating PO Meds and tolerating reg diet. Pt continued on her home medications.  Pt has 2 ANTHONY drains, which she will go come with and pt was given support for drain care.  On POD #3 patient was discharged to home in clinically stable condition. Vital signs were stable as well and lab values are stated below.  Pt has had an uneventful postoperative course.   Patient to have close follow up with Dr. Ureña. Pt has a follow up appointment for  January 28, 2019 at [facility] and with Dr. Andersen on 1/17/2019  Upon discharge on POD# 3, the patient is ambulating and voiding spontaneously, tolerating oral intake, pain was well controlled with oral medication, and vital signs were stable. All home care has been explained to pt and family.  Pt understands home instructions and all questions have been answered regarding home care including drains and discharge.  Medications sent to pharmacy of pt choice.  LABS:  WBC: 10.4->22->15.8->11.3->9.14       Hct: 40.2->41.4->34.6->33.1->33.5           Cr: 0.82->0.76->0.68->0.74->0.70         Phos: 3.2->3.1->1.7->Kphos->2.3

## 2019-01-11 NOTE — CHART NOTE - NSCHARTNOTEFT_GEN_A_CORE
GYN ONC PM ROUNDS    S: Patient seen at bedside. Patient doing well. Pain controlled well with PCA and per plan d/w anesthesia, will continue PCA til tomorrow. Denies N/V. Tolerating regular diet. Nix in place.    Vital Signs Last 24 Hrs  T(C): 36.8 (11 Jan 2019 13:30), Max: 36.8 (11 Jan 2019 10:36)  T(F): 98.2 (11 Jan 2019 13:30), Max: 98.3 (11 Jan 2019 10:36)  HR: 87 (11 Jan 2019 13:30) (66 - 96)  BP: 125/69 (11 Jan 2019 13:30) (111/53 - 131/65)  BP(mean): 80 (10 Antonio 2019 20:00) (74 - 87)  RR: 16 (11 Jan 2019 13:30) (12 - 18)  SpO2: 100% (11 Jan 2019 13:30) (91% - 100%)    Gen: comfortable, NAD  Abd: dressing in place  Ext: venodynes in place. No edema b/l, NT    I&O's Detail    10 Antonio 2019 07:01  -  11 Jan 2019 07:00  --------------------------------------------------------  IN:    IV PiggyBack: 200 mL    lactated ringers.: 1750 mL  Total IN: 1950 mL    OUT:    Bulb: 167.5 mL    Indwelling Catheter - Urethral: 2125 mL  Total OUT: 2292.5 mL    Total NET: -342.5 mL      11 Jan 2019 07:01  -  11 Jan 2019 17:19  --------------------------------------------------------  IN:  Total IN: 0 mL    OUT:    Bulb: 52.5 mL    Indwelling Catheter - Urethral: 875 mL  Total OUT: 927.5 mL    Total NET: -927.5 mL          Labs:                        11.0   15.84 )-----------( 265      ( 11 Jan 2019 13:53 )             34.6                         13.2   22.05 )-----------( 267      ( 10 Antonio 2019 15:30 )             41.4           MEDICATIONS  (STANDING):  acetaminophen  IVPB .. 1000 milliGRAM(s) IV Intermittent once  chlorhexidine 2% Cloths 1 Application(s) Topical once  dextrose 5%. 1000 milliLiter(s) (50 mL/Hr) IV Continuous <Continuous>  dextrose 50% Injectable 12.5 Gram(s) IV Push once  dextrose 50% Injectable 25 Gram(s) IV Push once  dextrose 50% Injectable 25 Gram(s) IV Push once  enoxaparin Injectable 40 milliGRAM(s) SubCutaneous daily  HYDROmorphone PCA (1 mG/mL) 30 milliLiter(s) PCA Continuous PCA Continuous  insulin lispro (HumaLOG) corrective regimen sliding scale   SubCutaneous three times a day before meals  insulin lispro (HumaLOG) corrective regimen sliding scale   SubCutaneous at bedtime  lactated ringers. 1000 milliLiter(s) (50 mL/Hr) IV Continuous <Continuous>  metoprolol tartrate Injectable 5 milliGRAM(s) IV Push every 6 hours  sodium chloride 0.9% lock flush 3 milliLiter(s) IV Push every 8 hours    A/P: 68y POD#1, s/p ex-lap, CHRISS, PLND, elevation of abdominal flap for endometrial ca. PMH significant for T2DM, breast ca s/p b/l mastectomy with TRAM flap reconstruction, chemo, Tamoxifen, Arimidex. VSS.    -continue PCA til tomorrow AM  -plan for harmony d/c tomorrow  -[facility] set up today  -continue regular diet    JFreling PGY4        -------------------------------------------------------------------------------------------------------------

## 2019-01-11 NOTE — DISCHARGE NOTE ADULT - MEDICATION SUMMARY - MEDICATIONS TO TAKE
I will START or STAY ON the medications listed below when I get home from the hospital:    spironolactone 25 mg oral tablet  -- 1  by mouth once a day  -- Indication: For Home medication    acetaminophen 325 mg oral tablet  -- 3 tab(s) by mouth every 6 hours  -- Indication: For Mild pain, as needed    ibuprofen 600 mg oral tablet  -- 1 tab(s) by mouth every 6 hours  -- Indication: For Moderate pain, as needed    oxyCODONE 5 mg oral tablet  -- 1 tab(s) by mouth every 6 hours, As Needed -Severe Pain MDD:4 tabs   -- Indication: For severe pain, as needed    aspirin 81 mg oral tablet  -- 1 tab(s) by mouth once a day, LD 01/02/2018  -- Indication: For Home medication    ramipril 10 mg oral capsule  -- 10 milligram(s) by mouth 2 times a day  -- Indication: For Home medication    enoxaparin 40 mg/0.4 mL injectable solution  -- 40 milligram(s) subcutaneously once a day   -- It is very important that you take or use this exactly as directed.  Do not skip doses or discontinue unless directed by your doctor.    -- Indication: For Prevent blood clots    metFORMIN 500 mg oral tablet  -- 1 tab(s) by mouth 2 times a day  -- Indication: For Home medication    simvastatin 40 mg oral tablet  -- 1 tab(s) by mouth once a day (at bedtime)  -- Indication: For Home medication    Zetia 10 mg oral tablet  -- 1 tab(s) by mouth once a day (at bedtime)  -- Indication: For Home medication    Vascepa 1 g oral capsule  -- 1  by mouth 2 times a day, last dose 1/2/19  -- Indication: For Home medication    Bystolic 10 mg oral tablet  -- 10 milligram(s) by mouth once a day (at bedtime)  -- Indication: For Home medication    amLODIPine 5 mg oral tablet  -- 1 tab(s) by mouth once a day (at bedtime)  -- Indication: For Home medication    magnesium oxide 500 mg oral tablet  -- 1 tab(s) by mouth once a day (at bedtime)  -- Indication: For Home medication    Vitamin D3 5000 intl units oral tablet  -- 1  by mouth once a week  -- Indication: For Home medication    Vitamin B12 1000 mcg oral tablet  -- 1 tab(s) by mouth once a day  -- Indication: For Home medication I will START or STAY ON the medications listed below when I get home from the hospital:    spironolactone 25 mg oral tablet  -- 1  by mouth once a day  -- Indication: For Home medication    acetaminophen 325 mg oral tablet  -- 3 tab(s) by mouth every 6 hours, As Needed  -- Indication: For Mild pain management    aspirin 81 mg oral tablet  -- 1 tab(s) by mouth once a day, restart 1/14  -- Indication: For Home medicine    ibuprofen 600 mg oral tablet  -- 1 tab(s) by mouth every 6 hours, As Needed  -- Indication: For Moderate pain management    oxyCODONE 5 mg oral tablet  -- 1 tab(s) by mouth every 4 to 6 hours, As Needed -Moderate Pain (4 - 6) MDD:6  -- Indication: For Moderate to severe pain management    ramipril 10 mg oral capsule  -- 10 milligram(s) by mouth 2 times a day  -- Indication: For Home medication    enoxaparin 40 mg/0.4 mL injectable solution  -- 40 milligram(s) subcutaneously once a day   -- It is very important that you take or use this exactly as directed.  Do not skip doses or discontinue unless directed by your doctor.    -- Indication: For Prevent blood clots    metFORMIN 500 mg oral tablet  -- 1 tab(s) by mouth 2 times a day  -- Indication: For diabetes    simvastatin 40 mg oral tablet  -- 1 tab(s) by mouth once a day (at bedtime)  -- Indication: For High cholesterol    Zetia 10 mg oral tablet  -- 1 tab(s) by mouth once a day (at bedtime)  -- Indication: For High cholesterol    Vascepa 1 g oral capsule  -- 1  by mouth 2 times a day, last dose 1/2/19  -- Indication: For Home medication    Bystolic 10 mg oral tablet  -- 10 milligram(s) by mouth once a day (at bedtime)  -- Indication: For Home medication    amLODIPine 5 mg oral tablet  -- 1 tab(s) by mouth once a day (at bedtime)  -- Indication: For Home medication    docusate sodium 100 mg oral capsule  -- 1 cap(s) by mouth 2 times a day, As Needed  -- Indication: For stool softener    magnesium oxide 500 mg oral tablet  -- 1 tab(s) by mouth once a day (at bedtime)  -- Indication: For Home medication    Vitamin D3 5000 intl units oral tablet  -- 1  by mouth once a week  -- Indication: For Home medication    Vitamin B12 1000 mcg oral tablet  -- 1 tab(s) by mouth once a day  -- Indication: For Home medication

## 2019-01-11 NOTE — DISCHARGE NOTE ADULT - INSTRUCTIONS
no dietery restrictions drink 9-13 eight oz. glasses of fluid daily. call md for sign of infection (temp greater than 101f, redness at incision, pain not relieved by meds). call md for follow up appt. no dietary restrictions

## 2019-01-11 NOTE — DISCHARGE NOTE ADULT - PATIENT PORTAL LINK FT
You can access the [facility] Patient Portal, offered by [facility], by registering with the following website: [email or web address]

## 2019-01-11 NOTE — CHART NOTE - NSCHARTNOTEFT_GEN_A_CORE
Lovenox 40mg injectable daily x28 syringes Rx sent to Pt's [facility] [phone]. Cost is $204.14 and was D/W Pt & her  and no issue over cost of the medication. Pharmacy has it in stock  & is filling it. Pharmacy closes tonight at 10PM and over the weekend 6PM which  knows. Home Lovenox teaching is ordered.

## 2019-01-11 NOTE — PROGRESS NOTE ADULT - PROBLEM SELECTOR PLAN 1
Neuro: Continue PCA for pain control when tolerating PO, transition to PO pain medications  CV: Hemodynamically stable. H/o HTN, HLD, and cardiac arrhythmia s/p PPM in 2011. When tolerating PO, transition to home meds (amlodipine, spironolactone, Bystolic)   Pulm: Saturating well on RA. Increase incentive spirometry.  GI: Advance to ADA regular diet today  : Nix in place. UOP adequate. d/c on 1/12/19.  Heme: Transition to Lovenox and continue Venodynes for DVT ppx. Increase OOB. Pt to take 28 days of Lovenox total for presumed hypercoaguable state.    ID: Afebrile  Endo: ISS. , 149, 123  Dispo: continue inpatient care    CESAR Forman PGY2

## 2019-01-11 NOTE — PROGRESS NOTE ADULT - SUBJECTIVE AND OBJECTIVE BOX
ANESTHESIA POSTOP CHECK    68y Female POSTOP DAY 1 S/P     [ X] NO APPARENT ANESTHESIA COMPLICATIONS      Comments:

## 2019-01-11 NOTE — DISCHARGE NOTE ADULT - NS AS ACTIVITY OBS
No Heavy lifting/straining/Walking-Indoors allowed/Showering allowed/Do not make important decisions/Walking-Outdoors allowed

## 2019-01-11 NOTE — PROGRESS NOTE ADULT - SUBJECTIVE AND OBJECTIVE BOX
YAMILE [facility]  [id number]    Subjective:    Patient seen and examined on morning rounds, no acute events overnight.   VSS, no complaints, awaiting swallow study.        Objective:  T(C): 36.6 (01-11-19 @ 06:27), Max: 36.9 (01-10-19 @ 17:00)  HR: 66 (01-11-19 @ 06:27) (66 - 96)  BP: 111/53 (01-11-19 @ 06:27) (111/53 - 140/65)  RR: 18 (01-11-19 @ 06:27) (11 - 22)  SpO2: 96% (01-11-19 @ 06:27) (88% - 99%)  Wt(kg): --   01-10    140  |  106  |  16  ----------------------------<  159<H>  4.3   |  21<L>  |  0.76    Ca    8.9      10 Antonio 2019 15:30  Phos  3.2     01-10  Mg     1.6     01-10                          13.2   22.05 )-----------( 267      ( 10 Antonio 2019 15:30 )             41.4       01-10 @ 07:01  -  01-11 @ 06:48  --------------------------------------------------------  IN: 1250 mL / OUT: 2267.5 mL / NET: -1017.5 mL      PHYSICAL EXAM:    General: Awake, alert, no acute distress   Abdomen: Incision CDI, no collections, ANTHONY SS. Binder on in place.   : Nix in place           MEDICATIONS  (STANDING):  acetaminophen  IVPB .. 1000 milliGRAM(s) IV Intermittent once  chlorhexidine 2% Cloths 1 Application(s) Topical once  dextrose 5%. 1000 milliLiter(s) (50 mL/Hr) IV Continuous <Continuous>  dextrose 50% Injectable 12.5 Gram(s) IV Push once  dextrose 50% Injectable 25 Gram(s) IV Push once  dextrose 50% Injectable 25 Gram(s) IV Push once  enoxaparin Injectable 40 milliGRAM(s) SubCutaneous daily  HYDROmorphone PCA (1 mG/mL) 30 milliLiter(s) PCA Continuous PCA Continuous  insulin lispro (HumaLOG) corrective regimen sliding scale   SubCutaneous three times a day before meals  insulin lispro (HumaLOG) corrective regimen sliding scale   SubCutaneous at bedtime  lactated ringers. 1000 milliLiter(s) (50 mL/Hr) IV Continuous <Continuous>  metoprolol tartrate Injectable 5 milliGRAM(s) IV Push every 6 hours  sodium chloride 0.9% lock flush 3 milliLiter(s) IV Push every 8 hours    MEDICATIONS  (PRN):  benzocaine 15 mG/menthol 3.6 mG (Sugar-Free) Lozenge 1 Lozenge Oral every 4 hours PRN Sore Throat  dextrose 40% Gel 15 Gram(s) Oral once PRN Blood Glucose LESS THAN 70 milliGRAM(s)/deciliter  diphenhydrAMINE 25 milliGRAM(s) Oral every 4 hours PRN Pruritus  docusate sodium 100 milliGRAM(s) Oral three times a day PRN Stool Softening  glucagon  Injectable 1 milliGRAM(s) IntraMuscular once PRN Glucose LESS THAN 70 milligrams/deciliter  naloxone Injectable 0.1 milliGRAM(s) IV Push every 3 minutes PRN For ANY of the following changes in patient status:  A. RR LESS THAN 10 breaths per minute, B. Oxygen saturation LESS THAN 90%, C. Sedation score of 6  ondansetron Injectable 4 milliGRAM(s) IV Push every 6 hours PRN Nausea

## 2019-01-11 NOTE — DISCHARGE NOTE ADULT - CONDITIONS AT DISCHARGE
pt ambulating, eating, voiding without difficulty. iv discontinued. no distress noted. lower abdomen incision intact and dry without sign of infection. patient's spouse is a retired MD and will give patient her lovenox injections and provide patient with ANTHONY drain care.

## 2019-01-11 NOTE — DISCHARGE NOTE ADULT - ADDITIONAL INSTRUCTIONS
Follow-up with Dr. Ureña on 1/28/19 at 12pm.  Call the office to confirm this appointment or change it Follow-up with Dr. Ureña on 1/28/19 at 12pm.  Call the office to confirm this appointment or change it  Follow up with Dr. Andersen on Thursday 1/17.  Call the office for an appointment

## 2019-01-11 NOTE — PROGRESS NOTE ADULT - ASSESSMENT
Assessment/Plan: 68y POD#1, s/p ex-lap, CHRISS, PLND, elevation of abdominal flap for endometrial ca. PMH significant for T2DM, breast ca s/p b/l mastectomy with TRAM flap reconstruction, chemo, Tamoxifen, Arimidex. VSS. AM labs pending.

## 2019-01-11 NOTE — PROGRESS NOTE ADULT - SUBJECTIVE AND OBJECTIVE BOX
Anesthesia Pain Management Service    SUBJECTIVE: Pt doing well with IV PCA without problems reported.    Therapy:	  [ X] IV PCA	   [ ] Epidural           [ ] s/p Spinal Opoid              [ ] Postpartum infusion	  [ ] Patient controlled regional anesthesia (PCRA)    [ ] prn Analgesics    Allergies    Celebrex (Nausea; Hives)  Demerol HCl (Nausea)    Intolerances      MEDICATIONS  (STANDING):  acetaminophen  IVPB .. 1000 milliGRAM(s) IV Intermittent once  chlorhexidine 2% Cloths 1 Application(s) Topical once  dextrose 5%. 1000 milliLiter(s) (50 mL/Hr) IV Continuous <Continuous>  dextrose 50% Injectable 12.5 Gram(s) IV Push once  dextrose 50% Injectable 25 Gram(s) IV Push once  dextrose 50% Injectable 25 Gram(s) IV Push once  enoxaparin Injectable 40 milliGRAM(s) SubCutaneous daily  HYDROmorphone PCA (1 mG/mL) 30 milliLiter(s) PCA Continuous PCA Continuous  insulin lispro (HumaLOG) corrective regimen sliding scale   SubCutaneous three times a day before meals  insulin lispro (HumaLOG) corrective regimen sliding scale   SubCutaneous at bedtime  lactated ringers. 1000 milliLiter(s) (50 mL/Hr) IV Continuous <Continuous>  metoprolol tartrate Injectable 5 milliGRAM(s) IV Push every 6 hours  sodium chloride 0.9% lock flush 3 milliLiter(s) IV Push every 8 hours    MEDICATIONS  (PRN):  benzocaine 15 mG/menthol 3.6 mG (Sugar-Free) Lozenge 1 Lozenge Oral every 4 hours PRN Sore Throat  dextrose 40% Gel 15 Gram(s) Oral once PRN Blood Glucose LESS THAN 70 milliGRAM(s)/deciliter  diphenhydrAMINE 25 milliGRAM(s) Oral every 4 hours PRN Pruritus  docusate sodium 100 milliGRAM(s) Oral three times a day PRN Stool Softening  glucagon  Injectable 1 milliGRAM(s) IntraMuscular once PRN Glucose LESS THAN 70 milligrams/deciliter  naloxone Injectable 0.1 milliGRAM(s) IV Push every 3 minutes PRN For ANY of the following changes in patient status:  A. RR LESS THAN 10 breaths per minute, B. Oxygen saturation LESS THAN 90%, C. Sedation score of 6  ondansetron Injectable 4 milliGRAM(s) IV Push every 6 hours PRN Nausea      OBJECTIVE:   [X] No new signs     [ ] Other:    Side Effects:  [X ] None			[ ] Other:    Assessment of Catheter Site:		[ ] Intact		[ ] Other:    ASSESSMENT/PLAN  [ X] Continue current therapy    [ ] Therapy changed to:    [ ] IV PCA       [ ] Epidural     [ ] prn Analgesics     Comments:

## 2019-01-11 NOTE — DISCHARGE NOTE ADULT - CARE PLAN
Principal Discharge DX:	Postoperative state  Goal:	recovery  Assessment and plan of treatment:	Expect abdominal cramping/pain and spotting for the next weeks. Take ibuprofen and Tylenol for cramping. Use a pad as needed. Call your physician or go to the emergency room if you experience any of the following:  vaginal bleeding, fever, chills, nausea, vomiting, or pain that is not controlled by medication. Principal Discharge DX:	Postoperative state  Goal:	recovery  Assessment and plan of treatment:	Expect abdominal pain and vaginal spotting for the next few weeks. Take ibuprofen and Tylenol for mild to moderate pain, as needed. Take oxycodone for severe pain, as needed. Use a pad as needed. Call your physician or go to the emergency room if you experience any of the following: opening or drainage from your incision site, fever, chills, nausea, vomiting, or pain that is not controlled by medication. Follow-up with Dr. Ureña on 1/28/19 at 12pm. Principal Discharge DX:	Postoperative state  Goal:	recovery, return to activities of daily living  Assessment and plan of treatment:	Expect abdominal pain and vaginal spotting for the next few weeks. Take ibuprofen and Tylenol for mild to moderate pain, as needed. Take oxycodone for severe pain, as needed. Use a pad as needed. Call your physician or go to the emergency room if you experience any of the following: opening or drainage from your incision site, fever, chills, nausea, vomiting, or pain that is not controlled by medication. Follow-up with Dr. Ureña on 1/28/19 at 12pm. Principal Discharge DX:	Postoperative state  Goal:	recovery, return to activities of daily living  Assessment and plan of treatment:	Expect abdominal pain and vaginal spotting for the next few weeks. Take ibuprofen and Tylenol for mild to moderate pain, as needed. Take oxycodone for severe pain, as needed. Use a pad as needed. Call your physician or go to the emergency room if you experience any of the following: opening or drainage from your incision site, fever, chills, nausea, vomiting, or pain that is not controlled by medication. Follow-up with Dr. Ureña on 1/28/19 at 12pm and with Dr. Andersen.

## 2019-01-11 NOTE — DISCHARGE NOTE ADULT - CARE PROVIDER_API CALL
Kolby Ureña), Gynecologic Oncology; Obstetrics and Gynecology  [address] [place] [address]  [address] [place] [address]  Phone: [phone]  Fax: [phone] Kolby Ureña), Gynecologic Oncology; Obstetrics and Gynecology  [address] [facility]  [address] [place] [address]  Phone: [phone]  Fax: [phone]    César Andersen), Plastic Surgery; Surgery of the Hand  [address] [address]  Phone: [phone]  Fax: [phone]

## 2019-01-11 NOTE — CHART NOTE - NSCHARTNOTEFT_GEN_A_CORE
Patient seen and examined.    Pain well controlled.  OOBTC today.  Tolerating diet.    Incision intact.  No collections.  Umbilicus viable.  Drains serosanguinous.    Continue current management.

## 2019-01-11 NOTE — PROGRESS NOTE ADULT - SUBJECTIVE AND OBJECTIVE BOX
Anesthesia Pain Management Service    SUBJECTIVE: Patient is doing well with IV PCA and no significant problems reported.    Pain Scale Score	At rest: _5/10__ 	With Activity: ___ 	[X ] Refer to charted pain scores    THERAPY:    [ ] IV PCA Morphine		[ ] 5 mg/mL	[ ] 1 mg/mL  [X ] IV PCA Hydromorphone	[ ] 5 mg/mL	[X ] 1 mg/mL  [ ] IV PCA Fentanyl		[ ] 50 micrograms/mL    Demand dose __0.2_ lockout __6_ (minutes) Continuous Rate _0__ Total: __1.6_  mg used (in past 24 hours)      MEDICATIONS  (STANDING):  acetaminophen  IVPB .. 1000 milliGRAM(s) IV Intermittent once  chlorhexidine 2% Cloths 1 Application(s) Topical once  dextrose 5%. 1000 milliLiter(s) (50 mL/Hr) IV Continuous <Continuous>  dextrose 50% Injectable 12.5 Gram(s) IV Push once  dextrose 50% Injectable 25 Gram(s) IV Push once  dextrose 50% Injectable 25 Gram(s) IV Push once  enoxaparin Injectable 40 milliGRAM(s) SubCutaneous daily  HYDROmorphone PCA (1 mG/mL) 30 milliLiter(s) PCA Continuous PCA Continuous  insulin lispro (HumaLOG) corrective regimen sliding scale   SubCutaneous three times a day before meals  insulin lispro (HumaLOG) corrective regimen sliding scale   SubCutaneous at bedtime  lactated ringers. 1000 milliLiter(s) (50 mL/Hr) IV Continuous <Continuous>  metoprolol tartrate Injectable 5 milliGRAM(s) IV Push every 6 hours  sodium chloride 0.9% lock flush 3 milliLiter(s) IV Push every 8 hours    MEDICATIONS  (PRN):  benzocaine 15 mG/menthol 3.6 mG (Sugar-Free) Lozenge 1 Lozenge Oral every 4 hours PRN Sore Throat  dextrose 40% Gel 15 Gram(s) Oral once PRN Blood Glucose LESS THAN 70 milliGRAM(s)/deciliter  diphenhydrAMINE 25 milliGRAM(s) Oral every 4 hours PRN Pruritus  docusate sodium 100 milliGRAM(s) Oral three times a day PRN Stool Softening  glucagon  Injectable 1 milliGRAM(s) IntraMuscular once PRN Glucose LESS THAN 70 milligrams/deciliter  naloxone Injectable 0.1 milliGRAM(s) IV Push every 3 minutes PRN For ANY of the following changes in patient status:  A. RR LESS THAN 10 breaths per minute, B. Oxygen saturation LESS THAN 90%, C. Sedation score of 6  ondansetron Injectable 4 milliGRAM(s) IV Push every 6 hours PRN Nausea      OBJECTIVE:  Patient sitting up in chair.    Sedation Score:	[ X] Alert	[ ] Drowsy 	[ ] Arousable	[ ] Asleep	[ ] Unresponsive    Side Effects:	[X ] None	[ ] Nausea	[ ] Vomiting	[ ] Pruritus  		[ ] Other:    Vital Signs Last 24 Hrs  T(C): 36.8 (11 Jan 2019 10:36), Max: 36.9 (10 Antonio 2019 17:00)  T(F): 98.3 (11 Jan 2019 10:36), Max: 98.4 (10 Antonio 2019 17:00)  HR: 84 (11 Jan 2019 10:36) (66 - 96)  BP: 125/57 (11 Jan 2019 10:36) (111/53 - 140/65)  BP(mean): 80 (10 Antonio 2019 20:00) (74 - 88)  RR: 18 (11 Jan 2019 10:36) (11 - 22)  SpO2: 99% (11 Jan 2019 10:36) (88% - 99%)    ASSESSMENT/ PLAN    Therapy to  be:	[ X] Continue   [ ] Discontinued   [ ] Change to prn Analgesics    Documentation and Verification of current medications:   [X] Done	[ ] Not done, not elligible    Comments:  Continue current pain regimen.  Patient's attending at bedside agrees to have IV PCA for another day.  Recommend discontinue IV PCA tomorrow morning.  IV tylenol ordered x1 dose at 2000 tonight.

## 2019-01-11 NOTE — DISCHARGE NOTE ADULT - PLAN OF CARE
recovery Expect abdominal cramping/pain and spotting for the next weeks. Take ibuprofen and Tylenol for cramping. Use a pad as needed. Call your physician or go to the emergency room if you experience any of the following:  vaginal bleeding, fever, chills, nausea, vomiting, or pain that is not controlled by medication. Expect abdominal pain and vaginal spotting for the next few weeks. Take ibuprofen and Tylenol for mild to moderate pain, as needed. Take oxycodone for severe pain, as needed. Use a pad as needed. Call your physician or go to the emergency room if you experience any of the following: opening or drainage from your incision site, fever, chills, nausea, vomiting, or pain that is not controlled by medication. Follow-up with Dr. Ureña on 1/28/19 at 12pm. recovery, return to activities of daily living Expect abdominal pain and vaginal spotting for the next few weeks. Take ibuprofen and Tylenol for mild to moderate pain, as needed. Take oxycodone for severe pain, as needed. Use a pad as needed. Call your physician or go to the emergency room if you experience any of the following: opening or drainage from your incision site, fever, chills, nausea, vomiting, or pain that is not controlled by medication. Follow-up with Dr. Ureña on 1/28/19 at 12pm and with Dr. Andersen.

## 2019-01-11 NOTE — PROGRESS NOTE ADULT - SUBJECTIVE AND OBJECTIVE BOX
R2 Gyn ONC Progress Note POD#1  HD#2    Subjective:   Pt seen and examined at bedside. Overnight, pt c/o of "bladder fullness," that resolved with unkinking the indwelling antunez catheter. Pain well controlled with PCA. Patient hasn't ambulated and has not passed flatus. Tolerating clear diet. Pt denies fever, chills, chest pain, SOB, nausea, vomiting, lightheadedness, dizziness.      Objective:  T(F): 97.6 (01-11-19 @ 01:04), Max: 98.4 (01-10-19 @ 17:00)  HR: 88 (01-11-19 @ 01:55) (79 - 96)  BP: 116/52 (01-11-19 @ 01:55) (114/55 - 140/65)  RR: 18 (01-11-19 @ 01:55) (11 - 22)  SpO2: 95% (01-11-19 @ 01:55) (88% - 99%)  Wt(kg): --  I&O's Summary    10 Antonio 2019 07:01  -  11 Jan 2019 06:24  --------------------------------------------------------  IN: 1250 mL / OUT: 2267.5 mL / NET: -1017.5 mL      CAPILLARY BLOOD GLUCOSE      POCT Blood Glucose.: 123 mg/dL (11 Jan 2019 05:45)  POCT Blood Glucose.: 149 mg/dL (10 Antonio 2019 21:57)  POCT Blood Glucose.: 146 mg/dL (10 Antonio 2019 15:22)  POCT Blood Glucose.: 118 mg/dL (10 Antonio 2019 06:44)      MEDICATIONS  (STANDING):  acetaminophen  IVPB .. 1000 milliGRAM(s) IV Intermittent once  acetaminophen  IVPB .. 1000 milliGRAM(s) IV Intermittent once  chlorhexidine 2% Cloths 1 Application(s) Topical once  dextrose 5%. 1000 milliLiter(s) (50 mL/Hr) IV Continuous <Continuous>  dextrose 50% Injectable 12.5 Gram(s) IV Push once  dextrose 50% Injectable 25 Gram(s) IV Push once  dextrose 50% Injectable 25 Gram(s) IV Push once  heparin  Injectable 5000 Unit(s) SubCutaneous every 8 hours  HYDROmorphone PCA (1 mG/mL) 30 milliLiter(s) PCA Continuous PCA Continuous  insulin lispro (HumaLOG) corrective regimen sliding scale   SubCutaneous three times a day before meals  insulin lispro (HumaLOG) corrective regimen sliding scale   SubCutaneous at bedtime  lactated ringers. 1000 milliLiter(s) (30 mL/Hr) IV Continuous <Continuous>  lactated ringers. 1000 milliLiter(s) (125 mL/Hr) IV Continuous <Continuous>  metoprolol tartrate Injectable 5 milliGRAM(s) IV Push every 6 hours  sodium chloride 0.9% lock flush 3 milliLiter(s) IV Push every 8 hours    MEDICATIONS  (PRN):  dextrose 40% Gel 15 Gram(s) Oral once PRN Blood Glucose LESS THAN 70 milliGRAM(s)/deciliter  diphenhydrAMINE 25 milliGRAM(s) Oral every 4 hours PRN Pruritus  docusate sodium 100 milliGRAM(s) Oral three times a day PRN Stool Softening  glucagon  Injectable 1 milliGRAM(s) IntraMuscular once PRN Glucose LESS THAN 70 milligrams/deciliter  naloxone Injectable 0.1 milliGRAM(s) IV Push every 3 minutes PRN For ANY of the following changes in patient status:  A. RR LESS THAN 10 breaths per minute, B. Oxygen saturation LESS THAN 90%, C. Sedation score of 6  ondansetron Injectable 4 milliGRAM(s) IV Push every 6 hours PRN Nausea      Physical Exam:  Constitutional: NAD, A+O x3  CV: RR S1S2 no m/r/g  Lungs: CTA b/l, good air flow b/l  Abdomen: soft, softly-distended, appropriately-tender. no guarding, no rebound, normal bowel sounds  Incision: abdominal dressing clean and dry. R ANTHONY drain draining serosanguinous fluid.  Extremities: no lower extremity edema or calf tenderness bilaterally; venodynes in place    LABS:    01-10    140    |  106    |  16     ----------------------------<  159<H>  4.3     |  21<L>  |  0.76     Ca    8.9        10 Antonio 2019 15:30  Phos  3.2       01-10  Mg     1.6       01-10

## 2019-01-11 NOTE — DISCHARGE NOTE ADULT - CARE PROVIDERS DIRECT ADDRESSES
[email or web address]

## 2019-01-12 DIAGNOSIS — Z98.890 OTHER SPECIFIED POSTPROCEDURAL STATES: ICD-10-CM

## 2019-01-12 LAB
ANION GAP SERPL CALC-SCNC: 11 MEQ/L — SIGNIFICANT CHANGE UP (ref 7–14)
BUN SERPL-MCNC: 12 MG/DL — SIGNIFICANT CHANGE UP (ref 7–23)
CALCIUM SERPL-MCNC: 9.4 MG/DL — SIGNIFICANT CHANGE UP (ref 8.4–10.5)
CHLORIDE SERPL-SCNC: 104 MMOL/L — SIGNIFICANT CHANGE UP (ref 98–107)
CO2 SERPL-SCNC: 25 MMOL/L — SIGNIFICANT CHANGE UP (ref 22–31)
CREAT SERPL-MCNC: 0.74 MG/DL — SIGNIFICANT CHANGE UP (ref 0.5–1.3)
GLUCOSE BLDC GLUCOMTR-MCNC: 105 MG/DL — HIGH (ref 70–99)
GLUCOSE BLDC GLUCOMTR-MCNC: 110 MG/DL — HIGH (ref 70–99)
GLUCOSE BLDC GLUCOMTR-MCNC: 117 MG/DL — HIGH (ref 70–99)
GLUCOSE SERPL-MCNC: 118 MG/DL — HIGH (ref 70–99)
HCT VFR BLD CALC: 33.1 % — LOW (ref 34.5–45)
HGB BLD-MCNC: 10.6 G/DL — LOW (ref 11.5–15.5)
MAGNESIUM SERPL-MCNC: 1.8 MG/DL — SIGNIFICANT CHANGE UP (ref 1.6–2.6)
MCHC RBC-ENTMCNC: 26.4 PG — LOW (ref 27–34)
MCHC RBC-ENTMCNC: 32 % — SIGNIFICANT CHANGE UP (ref 32–36)
MCV RBC AUTO: 82.5 FL — SIGNIFICANT CHANGE UP (ref 80–100)
NRBC # FLD: 0 K/UL — LOW (ref 25–125)
PHOSPHATE SERPL-MCNC: 1.7 MG/DL — LOW (ref 2.5–4.5)
PLATELET # BLD AUTO: 226 K/UL — SIGNIFICANT CHANGE UP (ref 150–400)
PMV BLD: 12.1 FL — SIGNIFICANT CHANGE UP (ref 7–13)
POTASSIUM SERPL-MCNC: 3.9 MMOL/L — SIGNIFICANT CHANGE UP (ref 3.5–5.3)
POTASSIUM SERPL-SCNC: 3.9 MMOL/L — SIGNIFICANT CHANGE UP (ref 3.5–5.3)
RBC # BLD: 4.01 M/UL — SIGNIFICANT CHANGE UP (ref 3.8–5.2)
RBC # FLD: 16.6 % — HIGH (ref 10.3–14.5)
SODIUM SERPL-SCNC: 140 MMOL/L — SIGNIFICANT CHANGE UP (ref 135–145)
WBC # BLD: 11.34 K/UL — HIGH (ref 3.8–10.5)
WBC # FLD AUTO: 11.34 K/UL — HIGH (ref 3.8–10.5)

## 2019-01-12 RX ORDER — SODIUM,POTASSIUM PHOSPHATES 278-250MG
1 POWDER IN PACKET (EA) ORAL
Qty: 0 | Refills: 0 | Status: DISCONTINUED | OUTPATIENT
Start: 2019-01-12 | End: 2019-01-12

## 2019-01-12 RX ORDER — IBUPROFEN 200 MG
600 TABLET ORAL EVERY 6 HOURS
Qty: 0 | Refills: 0 | Status: DISCONTINUED | OUTPATIENT
Start: 2019-01-12 | End: 2019-01-13

## 2019-01-12 RX ORDER — AMLODIPINE BESYLATE 2.5 MG/1
5 TABLET ORAL AT BEDTIME
Qty: 0 | Refills: 0 | Status: DISCONTINUED | OUTPATIENT
Start: 2019-01-12 | End: 2019-01-13

## 2019-01-12 RX ORDER — SODIUM,POTASSIUM PHOSPHATES 278-250MG
1 POWDER IN PACKET (EA) ORAL
Qty: 0 | Refills: 0 | Status: DISCONTINUED | OUTPATIENT
Start: 2019-01-12 | End: 2019-01-13

## 2019-01-12 RX ORDER — OXYCODONE HYDROCHLORIDE 5 MG/1
5 TABLET ORAL
Qty: 0 | Refills: 0 | Status: DISCONTINUED | OUTPATIENT
Start: 2019-01-12 | End: 2019-01-13

## 2019-01-12 RX ORDER — ACETAMINOPHEN 500 MG
975 TABLET ORAL EVERY 6 HOURS
Qty: 0 | Refills: 0 | Status: DISCONTINUED | OUTPATIENT
Start: 2019-01-12 | End: 2019-01-13

## 2019-01-12 RX ORDER — POTASSIUM PHOSPHATE, MONOBASIC POTASSIUM PHOSPHATE, DIBASIC 236; 224 MG/ML; MG/ML
15 INJECTION, SOLUTION INTRAVENOUS ONCE
Qty: 0 | Refills: 0 | Status: COMPLETED | OUTPATIENT
Start: 2019-01-12 | End: 2019-01-12

## 2019-01-12 RX ORDER — LISINOPRIL 2.5 MG/1
10 TABLET ORAL DAILY
Qty: 0 | Refills: 0 | Status: DISCONTINUED | OUTPATIENT
Start: 2019-01-12 | End: 2019-01-13

## 2019-01-12 RX ORDER — OXYCODONE HYDROCHLORIDE 5 MG/1
10 TABLET ORAL EVERY 4 HOURS
Qty: 0 | Refills: 0 | Status: DISCONTINUED | OUTPATIENT
Start: 2019-01-12 | End: 2019-01-13

## 2019-01-12 RX ORDER — SODIUM CHLORIDE 9 MG/ML
1000 INJECTION, SOLUTION INTRAVENOUS
Qty: 0 | Refills: 0 | Status: DISCONTINUED | OUTPATIENT
Start: 2019-01-12 | End: 2019-01-12

## 2019-01-12 RX ADMIN — SODIUM CHLORIDE 50 MILLILITER(S): 9 INJECTION, SOLUTION INTRAVENOUS at 01:31

## 2019-01-12 RX ADMIN — AMLODIPINE BESYLATE 5 MILLIGRAM(S): 2.5 TABLET ORAL at 21:59

## 2019-01-12 RX ADMIN — ENOXAPARIN SODIUM 40 MILLIGRAM(S): 100 INJECTION SUBCUTANEOUS at 08:54

## 2019-01-12 RX ADMIN — Medication 5 MILLIGRAM(S): at 05:08

## 2019-01-12 RX ADMIN — Medication 5 MILLIGRAM(S): at 01:31

## 2019-01-12 RX ADMIN — SODIUM CHLORIDE 3 MILLILITER(S): 9 INJECTION INTRAMUSCULAR; INTRAVENOUS; SUBCUTANEOUS at 05:09

## 2019-01-12 NOTE — PROGRESS NOTE ADULT - PROBLEM SELECTOR PLAN 1
Neuro: transition to po pain meds   CV: Hemodynamically stable  Pulm: Saturating well on room air, encourage incentive spirometry  GI: diabetic regular diet  : UOP adequate, d/c harmony  Heme: c/w lovenox and SCDs for DVT ppx  Dispo: Continue routine post-op care    KADEN Richard pgy2 Neuro: transition to po pain meds   CV: Hemodynamically stable. Will transition to home antihypertensives for BP control  Pulm: Saturating well on room air, encourage incentive spirometry  GI: diabetic regular diet  : UOP adequate, d/c harmony  Heme: c/w lovenox and SCDs for DVT ppx. Patient will require 28 days of lovenox for hypercoagulable state 2/2 open surgery and cancer diagnosis  Endo: ISS for glucose control  Dispo: Continue routine post-op care    KADEN Richard pgy2

## 2019-01-12 NOTE — PROGRESS NOTE ADULT - ASSESSMENT
ASSESSMENT:     68y POD#1, s/p ex-lap, CHRISS, PLND, elevation of abdominal flap with plastic surgery POD 2, doing well:     PLAN:     - Management per Gynonc, appreciate care   - Continue abd binder  - ANTHONY drain care   - Transition patient to oral pain medication, DC PCA  - DC antunez   - DVT ppx Lovenox   - Dispo planning per gyn     Plastic Surgery   [id number]

## 2019-01-12 NOTE — PROGRESS NOTE ADULT - SUBJECTIVE AND OBJECTIVE BOX
[facility]  [id number]    Subjective:    Patient seen and examined on am rounds, patient in bed, comfortable.   No complaints, VSS.        Objective:  T(C): 36.8 (01-12-19 @ 05:06), Max: 36.8 (01-11-19 @ 10:36)  HR: 82 (01-12-19 @ 05:06) (81 - 88)  BP: 127/61 (01-12-19 @ 05:06) (110/56 - 143/77)  RR: 18 (01-12-19 @ 05:06) (16 - 18)  SpO2: 100% (01-12-19 @ 05:06) (99% - 100%)  Wt(kg): --   01-12    140  |  104  |  12  ----------------------------<  118<H>  3.9   |  25  |  0.74    Ca    9.4      12 Jan 2019 04:30  Phos  1.7     01-12  Mg     1.8     01-12                          10.6   11.34 )-----------( 226      ( 12 Jan 2019 04:30 )             33.1       01-11 @ 07:01  -  01-12 @ 07:00  --------------------------------------------------------  IN: 1220 mL / OUT: 4757.5 mL / NET: -3537.5 mL      PHYSICAL EXAM:    General: Awake, alert, no acute distress  Abdomen: Soft, non distended. Incision CDI, no collections. Umbilicus viable. ANTHONY SS. Binder in place.   : Nix in place         MEDICATIONS  (STANDING):  acetaminophen   Tablet .. 975 milliGRAM(s) Oral every 6 hours  chlorhexidine 2% Cloths 1 Application(s) Topical once  dextrose 5%. 1000 milliLiter(s) (50 mL/Hr) IV Continuous <Continuous>  dextrose 50% Injectable 12.5 Gram(s) IV Push once  dextrose 50% Injectable 25 Gram(s) IV Push once  dextrose 50% Injectable 25 Gram(s) IV Push once  enoxaparin Injectable 40 milliGRAM(s) SubCutaneous daily  ibuprofen  Tablet. 600 milliGRAM(s) Oral every 6 hours  insulin lispro (HumaLOG) corrective regimen sliding scale   SubCutaneous three times a day before meals  insulin lispro (HumaLOG) corrective regimen sliding scale   SubCutaneous at bedtime  metoprolol tartrate Injectable 5 milliGRAM(s) IV Push every 6 hours  potassium phosphate IVPB 15 milliMole(s) IV Intermittent once  sodium chloride 0.9% lock flush 3 milliLiter(s) IV Push every 8 hours    MEDICATIONS  (PRN):  artificial  tears Solution 1 Drop(s) Both EYES every 6 hours PRN Dry Eyes  benzocaine 15 mG/menthol 3.6 mG (Sugar-Free) Lozenge 1 Lozenge Oral every 4 hours PRN Sore Throat  dextrose 40% Gel 15 Gram(s) Oral once PRN Blood Glucose LESS THAN 70 milliGRAM(s)/deciliter  diphenhydrAMINE 25 milliGRAM(s) Oral every 4 hours PRN Pruritus  docusate sodium 100 milliGRAM(s) Oral three times a day PRN Stool Softening  glucagon  Injectable 1 milliGRAM(s) IntraMuscular once PRN Glucose LESS THAN 70 milligrams/deciliter  naloxone Injectable 0.1 milliGRAM(s) IV Push every 3 minutes PRN For ANY of the following changes in patient status:  A. RR LESS THAN 10 breaths per minute, B. Oxygen saturation LESS THAN 90%, C. Sedation score of 6  ondansetron Injectable 4 milliGRAM(s) IV Push every 6 hours PRN Nausea  oxyCODONE    IR 5 milliGRAM(s) Oral every 3 hours PRN Moderate Pain (4 - 6)  oxyCODONE    IR 10 milliGRAM(s) Oral every 4 hours PRN Severe Pain (7 - 10)

## 2019-01-12 NOTE — CHART NOTE - NSCHARTNOTEFT_GEN_A_CORE
Seen yesterday evening (~8p)  Surgery and findings reviewed with pt.  Labs noted.   Instructions discussed.  Discussed coverage in my absence.   All Q/A.

## 2019-01-12 NOTE — PROGRESS NOTE ADULT - ASSESSMENT
69y/o POD#2 from Ex-lap, CHRISS, PLND, elevation of abdominal flap for Endometrial cancer in stable condition.

## 2019-01-12 NOTE — PROGRESS NOTE ADULT - SUBJECTIVE AND OBJECTIVE BOX
POD# 2  HD# 3    Patient seen and examined at bedside. No acute complaints, pain well controlled with PCA pump. Patient is ambulating, passing flatus, and tolerating regular diet. Denies CP, SOB, N/V, fevers, and chills. Nix in place.     Vital Signs Last 24 Hours  T(C): 36.8 (01-12-19 @ 05:06), Max: 36.8 (01-11-19 @ 10:36)  HR: 82 (01-12-19 @ 05:06) (81 - 88)  BP: 127/61 (01-12-19 @ 05:06) (110/56 - 143/77)  RR: 18 (01-12-19 @ 05:06) (16 - 18)  SpO2: 100% (01-12-19 @ 05:06) (99% - 100%)    I&O's Summary    10 Antonio 2019 07:01  -  11 Jan 2019 07:00  --------------------------------------------------------  IN: 1950 mL / OUT: 2292.5 mL / NET: -342.5 mL    11 Jan 2019 07:01  -  12 Jan 2019 06:44  --------------------------------------------------------  IN: 1220 mL / OUT: 3557.5 mL / NET: -2337.5 mL        Physical Exam:  General: NAD  CV: RRR, S1, S2, no murmurs  Lungs: CTAB, symmetrical expansion  Abdomen: Soft, appropriately-tender, non-distended, +BS  Incision: abdominoplasty incision, C/D/I  Ext: No pain or swelling    Labs:                        10.6   11.34 )-----------( 226      ( 12 Jan 2019 04:30 )             33.1   baso x      eos x      imm gran x      lymph x      mono x      poly x                            11.0   15.84 )-----------( 265      ( 11 Jan 2019 13:53 )             34.6   baso 0.2    eos 0.3    imm gran 0.6    lymph 11.6   mono 7.6    poly 79.7                         13.2   22.05 )-----------( 267      ( 10 Antonio 2019 15:30 )             41.4   baso 0.2    eos 0.0    imm gran 0.6    lymph 3.9    mono 5.0    poly 90.3       MEDICATIONS  (STANDING):  chlorhexidine 2% Cloths 1 Application(s) Topical once  dextrose 5%. 1000 milliLiter(s) (50 mL/Hr) IV Continuous <Continuous>  dextrose 50% Injectable 12.5 Gram(s) IV Push once  dextrose 50% Injectable 25 Gram(s) IV Push once  dextrose 50% Injectable 25 Gram(s) IV Push once  enoxaparin Injectable 40 milliGRAM(s) SubCutaneous daily  HYDROmorphone PCA (1 mG/mL) 30 milliLiter(s) PCA Continuous PCA Continuous  insulin lispro (HumaLOG) corrective regimen sliding scale   SubCutaneous three times a day before meals  insulin lispro (HumaLOG) corrective regimen sliding scale   SubCutaneous at bedtime  lactated ringers. 1000 milliLiter(s) (30 mL/Hr) IV Continuous <Continuous>  metoprolol tartrate Injectable 5 milliGRAM(s) IV Push every 6 hours  sodium chloride 0.9% lock flush 3 milliLiter(s) IV Push every 8 hours    MEDICATIONS  (PRN):  artificial  tears Solution 1 Drop(s) Both EYES every 6 hours PRN Dry Eyes  benzocaine 15 mG/menthol 3.6 mG (Sugar-Free) Lozenge 1 Lozenge Oral every 4 hours PRN Sore Throat  dextrose 40% Gel 15 Gram(s) Oral once PRN Blood Glucose LESS THAN 70 milliGRAM(s)/deciliter  diphenhydrAMINE 25 milliGRAM(s) Oral every 4 hours PRN Pruritus  docusate sodium 100 milliGRAM(s) Oral three times a day PRN Stool Softening  glucagon  Injectable 1 milliGRAM(s) IntraMuscular once PRN Glucose LESS THAN 70 milligrams/deciliter  naloxone Injectable 0.1 milliGRAM(s) IV Push every 3 minutes PRN For ANY of the following changes in patient status:  A. RR LESS THAN 10 breaths per minute, B. Oxygen saturation LESS THAN 90%, C. Sedation score of 6  ondansetron Injectable 4 milliGRAM(s) IV Push every 6 hours PRN Nausea

## 2019-01-12 NOTE — PROGRESS NOTE ADULT - SUBJECTIVE AND OBJECTIVE BOX
Anesthesia Pain Management Service    SUBJECTIVE: Patient is doing well with IV PCA and no significant problems reported.    Pain Scale Score	At rest: ___ 	With Activity: ___ 	[X ] Refer to charted pain scores    THERAPY:    [ ] IV PCA Morphine		[ ] 5 mg/mL	[ ] 1 mg/mL  [X ] IV PCA Hydromorphone	[ ] 5 mg/mL	[X ] 1 mg/mL  [ ] IV PCA Fentanyl		[ ] 50 micrograms/mL    Demand dose __0.2_ lockout __6_ (minutes) Continuous Rate _0__ Total: _1.0mg__  Daily      MEDICATIONS  (STANDING):  acetaminophen   Tablet .. 975 milliGRAM(s) Oral every 6 hours  chlorhexidine 2% Cloths 1 Application(s) Topical once  dextrose 5%. 1000 milliLiter(s) (50 mL/Hr) IV Continuous <Continuous>  dextrose 50% Injectable 12.5 Gram(s) IV Push once  dextrose 50% Injectable 25 Gram(s) IV Push once  dextrose 50% Injectable 25 Gram(s) IV Push once  enoxaparin Injectable 40 milliGRAM(s) SubCutaneous daily  ibuprofen  Tablet. 600 milliGRAM(s) Oral every 6 hours  insulin lispro (HumaLOG) corrective regimen sliding scale   SubCutaneous three times a day before meals  insulin lispro (HumaLOG) corrective regimen sliding scale   SubCutaneous at bedtime  lactated ringers. 1000 milliLiter(s) (30 mL/Hr) IV Continuous <Continuous>  metoprolol tartrate Injectable 5 milliGRAM(s) IV Push every 6 hours  potassium phosphate IVPB 15 milliMole(s) IV Intermittent once  sodium chloride 0.9% lock flush 3 milliLiter(s) IV Push every 8 hours    MEDICATIONS  (PRN):  artificial  tears Solution 1 Drop(s) Both EYES every 6 hours PRN Dry Eyes  benzocaine 15 mG/menthol 3.6 mG (Sugar-Free) Lozenge 1 Lozenge Oral every 4 hours PRN Sore Throat  dextrose 40% Gel 15 Gram(s) Oral once PRN Blood Glucose LESS THAN 70 milliGRAM(s)/deciliter  diphenhydrAMINE 25 milliGRAM(s) Oral every 4 hours PRN Pruritus  docusate sodium 100 milliGRAM(s) Oral three times a day PRN Stool Softening  glucagon  Injectable 1 milliGRAM(s) IntraMuscular once PRN Glucose LESS THAN 70 milligrams/deciliter  naloxone Injectable 0.1 milliGRAM(s) IV Push every 3 minutes PRN For ANY of the following changes in patient status:  A. RR LESS THAN 10 breaths per minute, B. Oxygen saturation LESS THAN 90%, C. Sedation score of 6  ondansetron Injectable 4 milliGRAM(s) IV Push every 6 hours PRN Nausea  oxyCODONE    IR 5 milliGRAM(s) Oral every 3 hours PRN Moderate Pain (4 - 6)  oxyCODONE    IR 10 milliGRAM(s) Oral every 4 hours PRN Severe Pain (7 - 10)      OBJECTIVE:    Sedation Score:	[ X] Alert	[ ] Drowsy 	[ ] Arousable	[ ] Asleep	[ ] Unresponsive    Side Effects:	[X ] None	[ ] Nausea	[ ] Vomiting	[ ] Pruritus  		[ ] Other:    Vital Signs Last 24 Hrs  T(C): 36.8 (12 Jan 2019 05:06), Max: 36.8 (11 Jan 2019 10:36)  T(F): 98.3 (12 Jan 2019 05:06), Max: 98.3 (11 Jan 2019 10:36)  HR: 82 (12 Jan 2019 05:06) (81 - 88)  BP: 127/61 (12 Jan 2019 05:06) (110/56 - 143/77)  BP(mean): --  RR: 18 (12 Jan 2019 05:06) (16 - 18)  SpO2: 100% (12 Jan 2019 05:06) (99% - 100%)    ASSESSMENT/ PLAN    Therapy to  be:	[ ] Continue   [ X] Discontinued   [X ] Change to prn Analgesics    Documentation and Verification of current medications:   [X] Done	[ ] Not done, not elligible    Comments: PRN Oral/IV opioids and/or Adjuvant medication to be ordered at this point.

## 2019-01-13 VITALS
DIASTOLIC BLOOD PRESSURE: 57 MMHG | HEART RATE: 84 BPM | TEMPERATURE: 98 F | RESPIRATION RATE: 20 BRPM | SYSTOLIC BLOOD PRESSURE: 111 MMHG | OXYGEN SATURATION: 100 %

## 2019-01-13 LAB
ANION GAP SERPL CALC-SCNC: 12 MEQ/L — SIGNIFICANT CHANGE UP (ref 7–14)
BUN SERPL-MCNC: 14 MG/DL — SIGNIFICANT CHANGE UP (ref 7–23)
CALCIUM SERPL-MCNC: 9.4 MG/DL — SIGNIFICANT CHANGE UP (ref 8.4–10.5)
CHLORIDE SERPL-SCNC: 103 MMOL/L — SIGNIFICANT CHANGE UP (ref 98–107)
CO2 SERPL-SCNC: 24 MMOL/L — SIGNIFICANT CHANGE UP (ref 22–31)
CREAT SERPL-MCNC: 0.7 MG/DL — SIGNIFICANT CHANGE UP (ref 0.5–1.3)
GLUCOSE BLDC GLUCOMTR-MCNC: 99 MG/DL — SIGNIFICANT CHANGE UP (ref 70–99)
GLUCOSE SERPL-MCNC: 114 MG/DL — HIGH (ref 70–99)
HCT VFR BLD CALC: 33.5 % — LOW (ref 34.5–45)
HGB BLD-MCNC: 10.8 G/DL — LOW (ref 11.5–15.5)
MAGNESIUM SERPL-MCNC: 1.8 MG/DL — SIGNIFICANT CHANGE UP (ref 1.6–2.6)
MCHC RBC-ENTMCNC: 26.4 PG — LOW (ref 27–34)
MCHC RBC-ENTMCNC: 32.2 % — SIGNIFICANT CHANGE UP (ref 32–36)
MCV RBC AUTO: 81.9 FL — SIGNIFICANT CHANGE UP (ref 80–100)
NRBC # FLD: 0 K/UL — LOW (ref 25–125)
PHOSPHATE SERPL-MCNC: 2.3 MG/DL — LOW (ref 2.5–4.5)
PLATELET # BLD AUTO: 229 K/UL — SIGNIFICANT CHANGE UP (ref 150–400)
PMV BLD: 12.2 FL — SIGNIFICANT CHANGE UP (ref 7–13)
POTASSIUM SERPL-MCNC: 3.6 MMOL/L — SIGNIFICANT CHANGE UP (ref 3.5–5.3)
POTASSIUM SERPL-SCNC: 3.6 MMOL/L — SIGNIFICANT CHANGE UP (ref 3.5–5.3)
RBC # BLD: 4.09 M/UL — SIGNIFICANT CHANGE UP (ref 3.8–5.2)
RBC # FLD: 16.3 % — HIGH (ref 10.3–14.5)
SODIUM SERPL-SCNC: 139 MMOL/L — SIGNIFICANT CHANGE UP (ref 135–145)
WBC # BLD: 9.14 K/UL — SIGNIFICANT CHANGE UP (ref 3.8–10.5)
WBC # FLD AUTO: 9.14 K/UL — SIGNIFICANT CHANGE UP (ref 3.8–10.5)

## 2019-01-13 RX ORDER — OXYCODONE HYDROCHLORIDE 5 MG/1
1 TABLET ORAL
Qty: 15 | Refills: 0 | OUTPATIENT
Start: 2019-01-13

## 2019-01-13 RX ORDER — ACETAMINOPHEN 500 MG
3 TABLET ORAL
Qty: 0 | Refills: 0 | COMMUNITY
Start: 2019-01-13

## 2019-01-13 RX ORDER — IBUPROFEN 200 MG
1 TABLET ORAL
Qty: 0 | Refills: 0 | DISCHARGE
Start: 2019-01-13

## 2019-01-13 RX ORDER — ACETAMINOPHEN 500 MG
3 TABLET ORAL
Qty: 0 | Refills: 0 | DISCHARGE
Start: 2019-01-13

## 2019-01-13 RX ORDER — ASPIRIN/CALCIUM CARB/MAGNESIUM 324 MG
1 TABLET ORAL
Qty: 0 | Refills: 0 | COMMUNITY

## 2019-01-13 RX ORDER — IBUPROFEN 200 MG
1 TABLET ORAL
Qty: 0 | Refills: 0 | COMMUNITY
Start: 2019-01-13

## 2019-01-13 RX ORDER — OXYCODONE HYDROCHLORIDE 5 MG/1
1 TABLET ORAL
Qty: 42 | Refills: 0
Start: 2019-01-13 | End: 2019-01-19

## 2019-01-13 RX ORDER — DOCUSATE SODIUM 100 MG
1 CAPSULE ORAL
Qty: 0 | Refills: 0 | COMMUNITY
Start: 2019-01-13

## 2019-01-13 RX ADMIN — LISINOPRIL 10 MILLIGRAM(S): 2.5 TABLET ORAL at 05:04

## 2019-01-13 RX ADMIN — ENOXAPARIN SODIUM 40 MILLIGRAM(S): 100 INJECTION SUBCUTANEOUS at 08:41

## 2019-01-13 RX ADMIN — Medication 1 DROP(S): at 05:04

## 2019-01-13 RX ADMIN — Medication 1 TABLET(S): at 08:45

## 2019-01-13 NOTE — PROGRESS NOTE ADULT - ASSESSMENT
ASSESSMENT: 68y POD3, s/p ex-lap, CHRISS, PLND, elevation of abdominal flap with plastic surgery, doing well:     PLAN:     - Management per Gynonc, appreciate care   - Continue abd binder  - ANTHONY drain care   - Pain control  - DVT ppx Lovenox   - OK to d/c home from PRS perspective    Plastic Surgery   [id number]

## 2019-01-13 NOTE — PROGRESS NOTE ADULT - SUBJECTIVE AND OBJECTIVE BOX
[facility] Progress Note POD #3    Pt seen, examined at bedside and doing well meeting all post operative milestones. Pt states mild abdominal pain.  Pt denies fever, chills, chest pain, SOB, nausea, vomiting, lightheadedness, dizziness.  Pt states passing flatus,     T(F): 97.9 (01-13-19 @ 05:06), Max: 98.6 (01-12-19 @ 17:37)  HR: 79 (01-13-19 @ 05:06) (79 - 87)  BP: 130/65 (01-13-19 @ 05:06) (115/47 - 146/73)  RR: 16 (01-13-19 @ 05:06) (16 - 18)  SpO2: 96% (01-13-19 @ 05:06) (96% - 100%)  Wt(kg): --  CAPILLARY BLOOD GLUCOSE    I&O's Detail    11 Jan 2019 07:01  -  12 Jan 2019 07:00  --------------------------------------------------------  IN:    IV PiggyBack: 100 mL    lactated ringers.: 1000 mL    lactated ringers.: 120 mL  Total IN: 1220 mL    OUT:    Bulb: 132.5 mL    Indwelling Catheter - Urethral: 4625 mL  Total OUT: 4757.5 mL    Total NET: -3537.5 mL      12 Jan 2019 07:01  -  13 Jan 2019 06:45  --------------------------------------------------------  IN:  Total IN: 0 mL    OUT:    Bulb: 102.5 mL    Voided: 1850 mL  Total OUT: 1952.5 mL    Total NET: -1952.5 mL          MEDICATIONS  (STANDING):  acetaminophen   Tablet .. 975 milliGRAM(s) Oral every 6 hours  amLODIPine   Tablet 5 milliGRAM(s) Oral at bedtime  chlorhexidine 2% Cloths 1 Application(s) Topical once  dextrose 5%. 1000 milliLiter(s) (50 mL/Hr) IV Continuous <Continuous>  dextrose 50% Injectable 12.5 Gram(s) IV Push once  dextrose 50% Injectable 25 Gram(s) IV Push once  dextrose 50% Injectable 25 Gram(s) IV Push once  enoxaparin Injectable 40 milliGRAM(s) SubCutaneous daily  ibuprofen  Tablet. 600 milliGRAM(s) Oral every 6 hours  insulin lispro (HumaLOG) corrective regimen sliding scale   SubCutaneous three times a day before meals  insulin lispro (HumaLOG) corrective regimen sliding scale   SubCutaneous at bedtime  lisinopril 10 milliGRAM(s) Oral daily  potassium acid phosphate/sodium acid phosphate tablet (K-PHOS No. 2) 1 Tablet(s) Oral four times a day with meals  sodium chloride 0.9% lock flush 3 milliLiter(s) IV Push every 8 hours    MEDICATIONS  (PRN):  artificial  tears Solution 1 Drop(s) Both EYES every 6 hours PRN Dry Eyes  benzocaine 15 mG/menthol 3.6 mG (Sugar-Free) Lozenge 1 Lozenge Oral every 4 hours PRN Sore Throat  dextrose 40% Gel 15 Gram(s) Oral once PRN Blood Glucose LESS THAN 70 milliGRAM(s)/deciliter  diphenhydrAMINE 25 milliGRAM(s) Oral every 4 hours PRN Pruritus  docusate sodium 100 milliGRAM(s) Oral three times a day PRN Stool Softening  glucagon  Injectable 1 milliGRAM(s) IntraMuscular once PRN Glucose LESS THAN 70 milligrams/deciliter  naloxone Injectable 0.1 milliGRAM(s) IV Push every 3 minutes PRN For ANY of the following changes in patient status:  A. RR LESS THAN 10 breaths per minute, B. Oxygen saturation LESS THAN 90%, C. Sedation score of 6  ondansetron Injectable 4 milliGRAM(s) IV Push every 6 hours PRN Nausea  oxyCODONE    IR 5 milliGRAM(s) Oral every 3 hours PRN Moderate Pain (4 - 6)  oxyCODONE    IR 10 milliGRAM(s) Oral every 4 hours PRN Severe Pain (7 - 10)      Physical Exam:  Constitutional: WDWN female, NAD AxOx3  Skin: no breakdowns noted, warm and dry  Chest: s1s2+, RRR, clear to auscultation bilaterally, no w/r/r    Abdomen: softly distended, no guarding, no rebound, [] bowel sounds, appropriate tenderness noted   Incision site:   vertical incision clean and dry with []intact.    Extremities: no lower extremity edema or calf tenderness bilaterally; intermittent compression stockings in place     LABS:             10.8   9.14  )-----------( 229      ( 01-13 @ 05:24 )             33.5                10.6   11.34 )-----------( 226      ( 01-12 @ 04:30 )             33.1                11.0   15.84 )-----------( 265      ( 01-11 @ 13:53 )             34.6                13.2   22.05 )-----------( 267      ( 01-10 @ 15:30 )             41.4       01-12    140    |  104    |  12     ----------------------------<  118<H>  3.9     |  25     |  0.74     Ca    9.4        12 Jan 2019 04:30  Phos  1.7       01-12  Mg     1.8       01-12                RADIOLOGY & ADDITIONAL TESTS:    a/p: This 68y female, s/p     CV: hemodynamically stable, H/H []  PUL: adequate on RA  GI:   :   [] adequate output without dysuria  ID: afebrile, WBC stable  DVT prophylaxis:   Pain Management: controlled on []  d/w   []  on morning rounds  -encourage ambulation  -encourage incentive spirometry  -start d/c planning  continue with current care    PEPE Clark  #[id number] [facility] Progress Note POD #3    Pt seen, examined at bedside and doing well meeting all post operative milestones. Pt states mild abdominal pain.  Pt denies fever, chills, chest pain, SOB, nausea, vomiting, lightheadedness, dizziness.  Pt states passing flatus,     T(F): 97.9 (01-13-19 @ 05:06), Max: 98.6 (01-12-19 @ 17:37)  HR: 79 (01-13-19 @ 05:06) (79 - 87)  BP: 130/65 (01-13-19 @ 05:06) (115/47 - 146/73)  RR: 16 (01-13-19 @ 05:06) (16 - 18)  SpO2: 96% (01-13-19 @ 05:06) (96% - 100%)  Wt(kg): --  CAPILLARY BLOOD GLUCOSE    I&O's Detail    11 Jan 2019 07:01  -  12 Jan 2019 07:00  --------------------------------------------------------  IN:    IV PiggyBack: 100 mL    lactated ringers.: 1000 mL    lactated ringers.: 120 mL  Total IN: 1220 mL    OUT:    Bulb: 132.5 mL    Indwelling Catheter - Urethral: 4625 mL  Total OUT: 4757.5 mL    Total NET: -3537.5 mL      12 Jan 2019 07:01  -  13 Jan 2019 06:45  --------------------------------------------------------  IN:  Total IN: 0 mL    OUT:    Bulb: 102.5 mL    Voided: 1850 mL  Total OUT: 1952.5 mL    Total NET: -1952.5 mL          MEDICATIONS  (STANDING):  acetaminophen   Tablet .. 975 milliGRAM(s) Oral every 6 hours  amLODIPine   Tablet 5 milliGRAM(s) Oral at bedtime  chlorhexidine 2% Cloths 1 Application(s) Topical once  dextrose 5%. 1000 milliLiter(s) (50 mL/Hr) IV Continuous <Continuous>  dextrose 50% Injectable 12.5 Gram(s) IV Push once  dextrose 50% Injectable 25 Gram(s) IV Push once  dextrose 50% Injectable 25 Gram(s) IV Push once  enoxaparin Injectable 40 milliGRAM(s) SubCutaneous daily  ibuprofen  Tablet. 600 milliGRAM(s) Oral every 6 hours  insulin lispro (HumaLOG) corrective regimen sliding scale   SubCutaneous three times a day before meals  insulin lispro (HumaLOG) corrective regimen sliding scale   SubCutaneous at bedtime  lisinopril 10 milliGRAM(s) Oral daily  potassium acid phosphate/sodium acid phosphate tablet (K-PHOS No. 2) 1 Tablet(s) Oral four times a day with meals  sodium chloride 0.9% lock flush 3 milliLiter(s) IV Push every 8 hours    MEDICATIONS  (PRN):  artificial  tears Solution 1 Drop(s) Both EYES every 6 hours PRN Dry Eyes  benzocaine 15 mG/menthol 3.6 mG (Sugar-Free) Lozenge 1 Lozenge Oral every 4 hours PRN Sore Throat  dextrose 40% Gel 15 Gram(s) Oral once PRN Blood Glucose LESS THAN 70 milliGRAM(s)/deciliter  diphenhydrAMINE 25 milliGRAM(s) Oral every 4 hours PRN Pruritus  docusate sodium 100 milliGRAM(s) Oral three times a day PRN Stool Softening  glucagon  Injectable 1 milliGRAM(s) IntraMuscular once PRN Glucose LESS THAN 70 milligrams/deciliter  naloxone Injectable 0.1 milliGRAM(s) IV Push every 3 minutes PRN For ANY of the following changes in patient status:  A. RR LESS THAN 10 breaths per minute, B. Oxygen saturation LESS THAN 90%, C. Sedation score of 6  ondansetron Injectable 4 milliGRAM(s) IV Push every 6 hours PRN Nausea  oxyCODONE    IR 5 milliGRAM(s) Oral every 3 hours PRN Moderate Pain (4 - 6)  oxyCODONE    IR 10 milliGRAM(s) Oral every 4 hours PRN Severe Pain (7 - 10)      Physical Exam:  Constitutional: WDWN female, NAD AxOx3  Skin: no breakdowns noted, warm and dry  Chest: s1s2+, RRR, clear to auscultation bilaterally, no w/r/r    Abdomen: softly distended, no guarding, no rebound, + bowel sounds, appropriate tenderness noted   Incision site:  low transverse abdominal flap incision clean and dry with steri strips intact. Abdominal binder in place  Drains: JPs patent and with sero sang fluid noted in bulbs    Extremities: no lower extremity edema or calf tenderness bilaterally; intermittent compression stockings in place     LABS:             10.8   9.14  )-----------( 229      ( 01-13 @ 05:24 )             33.5                10.6   11.34 )-----------( 226      ( 01-12 @ 04:30 )             33.1                11.0   15.84 )-----------( 265      ( 01-11 @ 13:53 )             34.6                13.2   22.05 )-----------( 267      ( 01-10 @ 15:30 )             41.4     01-13    139    |  103    |  14     ----------------------------<  114<H>  3.6     |  24     |  0.70     01-12    140    |  104    |  12     ----------------------------<  118<H>  3.9     |  25     |  0.74     Ca    9.4        13 Jan 2019 05:24  Ca    9.4        12 Jan 2019 04:30  Phos  2.3       01-13  Phos  1.7       01-12  Mg     1.8       01-13  Mg     1.8       01-12          a/p: This 68y female, s/p ExLap, CHRISS, PLND, elevation of abdominal flap for known endometrial cancer, pt stable      CV: hemodynamically stable, H/H stable  PUL: adequate on RA  GI: tolerating regular diabetic diet  :  voiding with adequate output without dysuria  ID: afebrile, WBC stable  DVT prophylaxis: Lovenox, which patient will require 28 days of Lovenox for hypercoagulable state 2/2 open surgery and cancer diagnosis  Pain Management: controlled on current regimen, which pt will continue at home as needed  d/w Dr. Varela on morning rounds  -pt understands home directions with ANTHONY care   -encourage ambulation  -encourage incentive spirometry  -start d/c planning for late morning discharge today  -all home meds sent to pharmacy of pt choice  continue with current care    SAIDA Clark  #[id number]

## 2019-01-13 NOTE — PROGRESS NOTE ADULT - SUBJECTIVE AND OBJECTIVE BOX
Plastic Surgery Progress Note (pg [facility]: [id number], [facility]: [phone])    SUBJECTIVE:  Doing well. No overnight events. Pain well controlled, ambulating, voiding, and tolerating regular diet. Ready for d/c home.    OBJECTIVE:     ** VITAL SIGNS / I&O's **    Vital Signs Last 24 Hrs  T(C): 36.6 (13 Jan 2019 05:06), Max: 37 (12 Jan 2019 17:37)  T(F): 97.9 (13 Jan 2019 05:06), Max: 98.6 (12 Jan 2019 17:37)  HR: 79 (13 Jan 2019 05:06) (79 - 87)  BP: 130/65 (13 Jan 2019 05:06) (115/47 - 146/73)  BP(mean): --  RR: 16 (13 Jan 2019 05:06) (16 - 18)  SpO2: 96% (13 Jan 2019 05:06) (96% - 100%)      12 Jan 2019 07:01  -  13 Jan 2019 07:00  --------------------------------------------------------  IN:  Total IN: 0 mL    OUT:    Bulb: 117.5 mL    Voided: 2150 mL  Total OUT: 2267.5 mL    Total NET: -2267.5 mL          ** PHYSICAL EXAM **    -- CONSTITUTIONAL: AOx3. NAD.   -- ABDOMEN: Soft, NT, incision c/d/i; no collections, ANTHONY ss; umbo viable      **MEDS**  acetaminophen   Tablet .. 975 milliGRAM(s) Oral every 6 hours  amLODIPine   Tablet 5 milliGRAM(s) Oral at bedtime  artificial  tears Solution 1 Drop(s) Both EYES every 6 hours PRN  benzocaine 15 mG/menthol 3.6 mG (Sugar-Free) Lozenge 1 Lozenge Oral every 4 hours PRN  chlorhexidine 2% Cloths 1 Application(s) Topical once  dextrose 40% Gel 15 Gram(s) Oral once PRN  dextrose 5%. 1000 milliLiter(s) IV Continuous <Continuous>  dextrose 50% Injectable 12.5 Gram(s) IV Push once  dextrose 50% Injectable 25 Gram(s) IV Push once  dextrose 50% Injectable 25 Gram(s) IV Push once  diphenhydrAMINE 25 milliGRAM(s) Oral every 4 hours PRN  docusate sodium 100 milliGRAM(s) Oral three times a day PRN  enoxaparin Injectable 40 milliGRAM(s) SubCutaneous daily  glucagon  Injectable 1 milliGRAM(s) IntraMuscular once PRN  ibuprofen  Tablet. 600 milliGRAM(s) Oral every 6 hours  insulin lispro (HumaLOG) corrective regimen sliding scale   SubCutaneous three times a day before meals  insulin lispro (HumaLOG) corrective regimen sliding scale   SubCutaneous at bedtime  lisinopril 10 milliGRAM(s) Oral daily  naloxone Injectable 0.1 milliGRAM(s) IV Push every 3 minutes PRN  ondansetron Injectable 4 milliGRAM(s) IV Push every 6 hours PRN  oxyCODONE    IR 5 milliGRAM(s) Oral every 3 hours PRN  oxyCODONE    IR 10 milliGRAM(s) Oral every 4 hours PRN  potassium acid phosphate/sodium acid phosphate tablet (K-PHOS No. 2) 1 Tablet(s) Oral four times a day with meals  sodium chloride 0.9% lock flush 3 milliLiter(s) IV Push every 8 hours      ** LABS **                          10.8   9.14  )-----------( 229      ( 13 Jan 2019 05:24 )             33.5     13 Jan 2019 05:24    139    |  103    |  14     ----------------------------<  114    3.6     |  24     |  0.70     Ca    9.4        13 Jan 2019 05:24  Phos  2.3       13 Jan 2019 05:24  Mg     1.8       13 Jan 2019 05:24        CAPILLARY BLOOD GLUCOSE      POCT Blood Glucose.: 99 mg/dL (13 Jan 2019 07:53)  POCT Blood Glucose.: 105 mg/dL (12 Jan 2019 21:43)  POCT Blood Glucose.: 117 mg/dL (12 Jan 2019 17:13)

## 2019-01-14 ENCOUNTER — INBOUND DOCUMENT (OUTPATIENT)
Age: 69
End: 2019-01-14

## 2019-01-16 LAB — SURGICAL PATHOLOGY STUDY: SIGNIFICANT CHANGE UP

## 2019-01-28 ENCOUNTER — APPOINTMENT (OUTPATIENT)
Dept: GYNECOLOGIC ONCOLOGY | Facility: CLINIC | Age: 69
End: 2019-01-28
Payer: MEDICARE

## 2019-01-28 PROCEDURE — 99024 POSTOP FOLLOW-UP VISIT: CPT

## 2019-01-28 NOTE — LETTER BODY
[FreeTextEntry2] : This letter is to provide follow up on Ana Novak who recently underwent exploration with CHRISS and michele sampling along with resection of a peritoneal cyst and abdominoplasty by Dr Andersen.\par \par She is recuperating well.\par \par The pathology revealed a Grade I Stage IA lesion. No adjuvant therapy is advised. Testing for MMR proteins suggested a pattern possibly associated with a hereditary syndrome, and as such, I advised a genetics consultation. This would be a different genetic phenomenon than her known BRCA2 mutation.\par \par We discussed my advise for surveillance, and I plan to see her in 3 months.

## 2019-01-28 NOTE — REASON FOR VISIT
[de-identified] : 1/10/19 [de-identified] : CHRISS, [facility], Plastics ([place]) [de-identified] : She RTO feeling well. No VB or VD. No fever.

## 2019-01-28 NOTE — DISCUSSION/SUMMARY
[Erythema] : was not erythematous [Ecchymosis] : was not ecchymotic [FreeTextEntry9] : s/nt/nd; no CVAT, trace edema [de-identified] : deferred

## 2019-01-28 NOTE — ASSESSMENT
[FreeTextEntry1] : I met with the pt and her . She is recuperating well and we discussed her path. No adjuvant therapy is advised. We also discussed the MMR protein pattern and the rec for a genetics evaluation; I gave her SC's card and advised a consult. Her instructions were discussed. All Q/A. She'll RTO in 3m and continue to see Dr Andersen.

## 2019-02-15 ENCOUNTER — TRANSCRIPTION ENCOUNTER (OUTPATIENT)
Age: 69
End: 2019-02-15

## 2019-04-18 ENCOUNTER — TRANSCRIPTION ENCOUNTER (OUTPATIENT)
Age: 69
End: 2019-04-18

## 2019-04-22 ENCOUNTER — APPOINTMENT (OUTPATIENT)
Dept: GYNECOLOGIC ONCOLOGY | Facility: CLINIC | Age: 69
End: 2019-04-22
Payer: MEDICARE

## 2019-04-22 VITALS
DIASTOLIC BLOOD PRESSURE: 82 MMHG | HEIGHT: 63 IN | WEIGHT: 185 LBS | BODY MASS INDEX: 32.78 KG/M2 | SYSTOLIC BLOOD PRESSURE: 133 MMHG

## 2019-04-22 PROCEDURE — 99214 OFFICE O/P EST MOD 30 MIN: CPT

## 2019-04-26 ENCOUNTER — OUTPATIENT (OUTPATIENT)
Dept: OUTPATIENT SERVICES | Facility: HOSPITAL | Age: 69
LOS: 1 days | Discharge: HOME | End: 2019-04-26

## 2019-04-26 DIAGNOSIS — Z98.891 HISTORY OF UTERINE SCAR FROM PREVIOUS SURGERY: Chronic | ICD-10-CM

## 2019-04-26 DIAGNOSIS — Z31.5 ENCOUNTER FOR PROCREATIVE GENETIC COUNSELING: ICD-10-CM

## 2019-04-26 DIAGNOSIS — Z90.722 ACQUIRED ABSENCE OF OVARIES, BILATERAL: Chronic | ICD-10-CM

## 2019-04-26 DIAGNOSIS — Z98.890 OTHER SPECIFIED POSTPROCEDURAL STATES: Chronic | ICD-10-CM

## 2019-04-26 DIAGNOSIS — Z90.13 ACQUIRED ABSENCE OF BILATERAL BREASTS AND NIPPLES: Chronic | ICD-10-CM

## 2019-04-30 ENCOUNTER — LABORATORY RESULT (OUTPATIENT)
Age: 69
End: 2019-04-30

## 2019-04-30 ENCOUNTER — APPOINTMENT (OUTPATIENT)
Dept: HEMATOLOGY ONCOLOGY | Facility: CLINIC | Age: 69
End: 2019-04-30
Payer: SELF-PAY

## 2019-04-30 PROCEDURE — 99499A: CUSTOM | Mod: NC

## 2019-04-30 PROCEDURE — 96040M: CUSTOM

## 2019-05-24 ENCOUNTER — OUTPATIENT (OUTPATIENT)
Dept: OUTPATIENT SERVICES | Facility: HOSPITAL | Age: 69
LOS: 1 days | Discharge: HOME | End: 2019-05-24

## 2019-05-24 DIAGNOSIS — Z31.5 ENCOUNTER FOR PROCREATIVE GENETIC COUNSELING: ICD-10-CM

## 2019-05-24 DIAGNOSIS — Z98.890 OTHER SPECIFIED POSTPROCEDURAL STATES: Chronic | ICD-10-CM

## 2019-05-24 DIAGNOSIS — Z90.13 ACQUIRED ABSENCE OF BILATERAL BREASTS AND NIPPLES: Chronic | ICD-10-CM

## 2019-05-24 DIAGNOSIS — Z90.722 ACQUIRED ABSENCE OF OVARIES, BILATERAL: Chronic | ICD-10-CM

## 2019-05-24 DIAGNOSIS — Z98.891 HISTORY OF UTERINE SCAR FROM PREVIOUS SURGERY: Chronic | ICD-10-CM

## 2019-05-30 ENCOUNTER — APPOINTMENT (OUTPATIENT)
Dept: HEMATOLOGY ONCOLOGY | Facility: CLINIC | Age: 69
End: 2019-05-30
Payer: SELF-PAY

## 2019-05-30 PROCEDURE — 99499A: CUSTOM | Mod: NC

## 2019-06-12 ENCOUNTER — TRANSCRIPTION ENCOUNTER (OUTPATIENT)
Age: 69
End: 2019-06-12

## 2019-07-31 ENCOUNTER — APPOINTMENT (OUTPATIENT)
Dept: GYNECOLOGIC ONCOLOGY | Facility: CLINIC | Age: 69
End: 2019-07-31
Payer: MEDICARE

## 2019-07-31 VITALS
WEIGHT: 173 LBS | HEIGHT: 63 IN | SYSTOLIC BLOOD PRESSURE: 121 MMHG | DIASTOLIC BLOOD PRESSURE: 82 MMHG | BODY MASS INDEX: 30.65 KG/M2

## 2019-07-31 PROCEDURE — 99213 OFFICE O/P EST LOW 20 MIN: CPT

## 2019-10-29 ENCOUNTER — TRANSCRIPTION ENCOUNTER (OUTPATIENT)
Age: 69
End: 2019-10-29

## 2019-10-30 ENCOUNTER — INPATIENT (INPATIENT)
Facility: HOSPITAL | Age: 69
LOS: 2 days | Discharge: HOME | End: 2019-11-02
Attending: SURGERY | Admitting: SURGERY
Payer: MEDICARE

## 2019-10-30 VITALS
SYSTOLIC BLOOD PRESSURE: 137 MMHG | RESPIRATION RATE: 18 BRPM | HEART RATE: 84 BPM | OXYGEN SATURATION: 99 % | DIASTOLIC BLOOD PRESSURE: 73 MMHG | TEMPERATURE: 98 F

## 2019-10-30 DIAGNOSIS — Z90.13 ACQUIRED ABSENCE OF BILATERAL BREASTS AND NIPPLES: Chronic | ICD-10-CM

## 2019-10-30 DIAGNOSIS — K56.609 UNSPECIFIED INTESTINAL OBSTRUCTION, UNSPECIFIED AS TO PARTIAL VERSUS COMPLETE OBSTRUCTION: ICD-10-CM

## 2019-10-30 DIAGNOSIS — Z90.722 ACQUIRED ABSENCE OF OVARIES, BILATERAL: Chronic | ICD-10-CM

## 2019-10-30 DIAGNOSIS — Z98.891 HISTORY OF UTERINE SCAR FROM PREVIOUS SURGERY: Chronic | ICD-10-CM

## 2019-10-30 DIAGNOSIS — Z98.890 OTHER SPECIFIED POSTPROCEDURAL STATES: Chronic | ICD-10-CM

## 2019-10-30 LAB
ALBUMIN SERPL ELPH-MCNC: 4 G/DL — SIGNIFICANT CHANGE UP (ref 3.3–5)
ALP SERPL-CCNC: 63 U/L — SIGNIFICANT CHANGE UP (ref 40–120)
ALT FLD-CCNC: 17 U/L — SIGNIFICANT CHANGE UP (ref 4–33)
ANION GAP SERPL CALC-SCNC: 19 MMO/L — HIGH (ref 7–14)
APPEARANCE UR: CLEAR — SIGNIFICANT CHANGE UP
APTT BLD: 27.2 SEC — LOW (ref 27.5–36.3)
AST SERPL-CCNC: 29 U/L — SIGNIFICANT CHANGE UP (ref 4–32)
BACTERIA # UR AUTO: NEGATIVE — SIGNIFICANT CHANGE UP
BASE EXCESS BLDA CALC-SCNC: -0.5 MMOL/L — SIGNIFICANT CHANGE UP
BASE EXCESS BLDV CALC-SCNC: -0.4 MMOL/L — SIGNIFICANT CHANGE UP
BASE EXCESS BLDV CALC-SCNC: 0 MMOL/L — SIGNIFICANT CHANGE UP
BASOPHILS # BLD AUTO: 0.04 K/UL — SIGNIFICANT CHANGE UP (ref 0–0.2)
BASOPHILS NFR BLD AUTO: 0.3 % — SIGNIFICANT CHANGE UP (ref 0–2)
BILIRUB SERPL-MCNC: 0.5 MG/DL — SIGNIFICANT CHANGE UP (ref 0.2–1.2)
BILIRUB UR-MCNC: NEGATIVE — SIGNIFICANT CHANGE UP
BLD GP AB SCN SERPL QL: NEGATIVE — SIGNIFICANT CHANGE UP
BLOOD GAS VENOUS - CREATININE: 0.82 MG/DL — SIGNIFICANT CHANGE UP (ref 0.5–1.3)
BLOOD GAS VENOUS - CREATININE: SIGNIFICANT CHANGE UP MG/DL (ref 0.5–1.3)
BLOOD GAS VENOUS - FIO2: 21 — SIGNIFICANT CHANGE UP
BLOOD UR QL VISUAL: SIGNIFICANT CHANGE UP
BUN SERPL-MCNC: 31 MG/DL — HIGH (ref 7–23)
CA-I BLDA-SCNC: 1.17 MMOL/L — SIGNIFICANT CHANGE UP (ref 1.15–1.29)
CALCIUM SERPL-MCNC: 9.7 MG/DL — SIGNIFICANT CHANGE UP (ref 8.4–10.5)
CHLORIDE BLDV-SCNC: 104 MMOL/L — SIGNIFICANT CHANGE UP (ref 96–108)
CHLORIDE BLDV-SCNC: 104 MMOL/L — SIGNIFICANT CHANGE UP (ref 96–108)
CHLORIDE SERPL-SCNC: 99 MMOL/L — SIGNIFICANT CHANGE UP (ref 98–107)
CO2 SERPL-SCNC: 16 MMOL/L — LOW (ref 22–31)
COLOR SPEC: SIGNIFICANT CHANGE UP
CREAT SERPL-MCNC: 0.94 MG/DL — SIGNIFICANT CHANGE UP (ref 0.5–1.3)
EOSINOPHIL # BLD AUTO: 0.04 K/UL — SIGNIFICANT CHANGE UP (ref 0–0.5)
EOSINOPHIL NFR BLD AUTO: 0.3 % — SIGNIFICANT CHANGE UP (ref 0–6)
GAS PNL BLDV: 131 MMOL/L — LOW (ref 136–146)
GAS PNL BLDV: 137 MMOL/L — SIGNIFICANT CHANGE UP (ref 136–146)
GLUCOSE BLDA-MCNC: 117 MG/DL — HIGH (ref 70–99)
GLUCOSE BLDV-MCNC: 101 MG/DL — HIGH (ref 70–99)
GLUCOSE BLDV-MCNC: 109 MG/DL — HIGH (ref 70–99)
GLUCOSE SERPL-MCNC: 107 MG/DL — HIGH (ref 70–99)
GLUCOSE UR-MCNC: NEGATIVE — SIGNIFICANT CHANGE UP
HCO3 BLDA-SCNC: 24 MMOL/L — SIGNIFICANT CHANGE UP (ref 22–26)
HCO3 BLDV-SCNC: 24 MMOL/L — SIGNIFICANT CHANGE UP (ref 20–27)
HCO3 BLDV-SCNC: 24 MMOL/L — SIGNIFICANT CHANGE UP (ref 20–27)
HCT VFR BLD CALC: 40.7 % — SIGNIFICANT CHANGE UP (ref 34.5–45)
HCT VFR BLDA CALC: 36.7 % — SIGNIFICANT CHANGE UP (ref 34.5–46.5)
HCT VFR BLDV CALC: 36.3 % — SIGNIFICANT CHANGE UP (ref 34.5–45)
HCT VFR BLDV CALC: 36.7 % — SIGNIFICANT CHANGE UP (ref 34.5–45)
HGB BLD-MCNC: 13 G/DL — SIGNIFICANT CHANGE UP (ref 11.5–15.5)
HGB BLDA-MCNC: 11.9 G/DL — SIGNIFICANT CHANGE UP (ref 11.5–15.5)
HGB BLDV-MCNC: 11.8 G/DL — SIGNIFICANT CHANGE UP (ref 11.5–15.5)
HGB BLDV-MCNC: 11.9 G/DL — SIGNIFICANT CHANGE UP (ref 11.5–15.5)
HYALINE CASTS # UR AUTO: NEGATIVE — SIGNIFICANT CHANGE UP
IMM GRANULOCYTES NFR BLD AUTO: 0.6 % — SIGNIFICANT CHANGE UP (ref 0–1.5)
INR BLD: 1.18 — HIGH (ref 0.88–1.17)
KETONES UR-MCNC: SIGNIFICANT CHANGE UP
LACTATE BLDV-MCNC: 1.3 MMOL/L — SIGNIFICANT CHANGE UP (ref 0.5–2)
LACTATE BLDV-MCNC: 2.2 MMOL/L — HIGH (ref 0.5–2)
LEUKOCYTE ESTERASE UR-ACNC: NEGATIVE — SIGNIFICANT CHANGE UP
LYMPHOCYTES # BLD AUTO: 1.56 K/UL — SIGNIFICANT CHANGE UP (ref 1–3.3)
LYMPHOCYTES # BLD AUTO: 13.1 % — SIGNIFICANT CHANGE UP (ref 13–44)
MCHC RBC-ENTMCNC: 25.7 PG — LOW (ref 27–34)
MCHC RBC-ENTMCNC: 31.9 % — LOW (ref 32–36)
MCV RBC AUTO: 80.4 FL — SIGNIFICANT CHANGE UP (ref 80–100)
MONOCYTES # BLD AUTO: 1.26 K/UL — HIGH (ref 0–0.9)
MONOCYTES NFR BLD AUTO: 10.6 % — SIGNIFICANT CHANGE UP (ref 2–14)
NEUTROPHILS # BLD AUTO: 8.9 K/UL — HIGH (ref 1.8–7.4)
NEUTROPHILS NFR BLD AUTO: 75.1 % — SIGNIFICANT CHANGE UP (ref 43–77)
NITRITE UR-MCNC: NEGATIVE — SIGNIFICANT CHANGE UP
NRBC # FLD: 0 K/UL — SIGNIFICANT CHANGE UP (ref 0–0)
PCO2 BLDA: 35 MMHG — SIGNIFICANT CHANGE UP (ref 32–48)
PCO2 BLDV: 31 MMHG — LOW (ref 41–51)
PCO2 BLDV: 33 MMHG — LOW (ref 41–51)
PH BLDA: 7.44 PH — SIGNIFICANT CHANGE UP (ref 7.35–7.45)
PH BLDV: 7.47 PH — HIGH (ref 7.32–7.43)
PH BLDV: 7.47 PH — HIGH (ref 7.32–7.43)
PH UR: 6.5 — SIGNIFICANT CHANGE UP (ref 5–8)
PLATELET # BLD AUTO: 237 K/UL — SIGNIFICANT CHANGE UP (ref 150–400)
PMV BLD: 12.8 FL — SIGNIFICANT CHANGE UP (ref 7–13)
PO2 BLDA: 196 MMHG — HIGH (ref 83–108)
PO2 BLDV: 24 MMHG — LOW (ref 35–40)
PO2 BLDV: 37 MMHG — SIGNIFICANT CHANGE UP (ref 35–40)
POTASSIUM BLDA-SCNC: 2.9 MMOL/L — LOW (ref 3.4–4.5)
POTASSIUM BLDV-SCNC: 2.9 MMOL/L — CRITICAL LOW (ref 3.4–4.5)
POTASSIUM BLDV-SCNC: 4.5 MMOL/L — SIGNIFICANT CHANGE UP (ref 3.4–4.5)
POTASSIUM SERPL-MCNC: 3.9 MMOL/L — SIGNIFICANT CHANGE UP (ref 3.5–5.3)
POTASSIUM SERPL-SCNC: 3.9 MMOL/L — SIGNIFICANT CHANGE UP (ref 3.5–5.3)
PROT SERPL-MCNC: 7.4 G/DL — SIGNIFICANT CHANGE UP (ref 6–8.3)
PROT UR-MCNC: NEGATIVE — SIGNIFICANT CHANGE UP
PROTHROM AB SERPL-ACNC: 13.2 SEC — HIGH (ref 9.8–13.1)
RBC # BLD: 5.06 M/UL — SIGNIFICANT CHANGE UP (ref 3.8–5.2)
RBC # FLD: 16.1 % — HIGH (ref 10.3–14.5)
RBC CASTS # UR COMP ASSIST: SIGNIFICANT CHANGE UP (ref 0–?)
RH IG SCN BLD-IMP: NEGATIVE — SIGNIFICANT CHANGE UP
SAO2 % BLDA: 97.9 % — SIGNIFICANT CHANGE UP (ref 95–99)
SAO2 % BLDV: 45.7 % — LOW (ref 60–85)
SAO2 % BLDV: 70.4 % — SIGNIFICANT CHANGE UP (ref 60–85)
SODIUM BLDA-SCNC: 135 MMOL/L — LOW (ref 136–146)
SODIUM SERPL-SCNC: 134 MMOL/L — LOW (ref 135–145)
SP GR SPEC: 1.03 — SIGNIFICANT CHANGE UP (ref 1–1.04)
SQUAMOUS # UR AUTO: SIGNIFICANT CHANGE UP
UROBILINOGEN FLD QL: NORMAL — SIGNIFICANT CHANGE UP
WBC # BLD: 11.87 K/UL — HIGH (ref 3.8–10.5)
WBC # FLD AUTO: 11.87 K/UL — HIGH (ref 3.8–10.5)
WBC UR QL: SIGNIFICANT CHANGE UP (ref 0–?)

## 2019-10-30 PROCEDURE — 74177 CT ABD & PELVIS W/CONTRAST: CPT | Mod: 26

## 2019-10-30 PROCEDURE — 71045 X-RAY EXAM CHEST 1 VIEW: CPT | Mod: 26

## 2019-10-30 RX ORDER — NALOXONE HYDROCHLORIDE 4 MG/.1ML
0.1 SPRAY NASAL
Refills: 0 | Status: DISCONTINUED | OUTPATIENT
Start: 2019-10-30 | End: 2019-11-02

## 2019-10-30 RX ORDER — SODIUM CHLORIDE 9 MG/ML
2000 INJECTION, SOLUTION INTRAVENOUS ONCE
Refills: 0 | Status: COMPLETED | OUTPATIENT
Start: 2019-10-30 | End: 2019-10-30

## 2019-10-30 RX ORDER — HYDROMORPHONE HYDROCHLORIDE 2 MG/ML
0.5 INJECTION INTRAMUSCULAR; INTRAVENOUS; SUBCUTANEOUS
Refills: 0 | Status: DISCONTINUED | OUTPATIENT
Start: 2019-10-30 | End: 2019-11-01

## 2019-10-30 RX ORDER — SODIUM CHLORIDE 9 MG/ML
2000 INJECTION, SOLUTION INTRAVENOUS ONCE
Refills: 0 | Status: DISCONTINUED | OUTPATIENT
Start: 2019-10-30 | End: 2019-10-30

## 2019-10-30 RX ORDER — ONDANSETRON 8 MG/1
4 TABLET, FILM COATED ORAL ONCE
Refills: 0 | Status: COMPLETED | OUTPATIENT
Start: 2019-10-30 | End: 2019-10-30

## 2019-10-30 RX ORDER — HYDROMORPHONE HYDROCHLORIDE 2 MG/ML
0.5 INJECTION INTRAMUSCULAR; INTRAVENOUS; SUBCUTANEOUS
Refills: 0 | Status: DISCONTINUED | OUTPATIENT
Start: 2019-10-30 | End: 2019-10-31

## 2019-10-30 RX ORDER — HYDROMORPHONE HYDROCHLORIDE 2 MG/ML
30 INJECTION INTRAMUSCULAR; INTRAVENOUS; SUBCUTANEOUS
Refills: 0 | Status: DISCONTINUED | OUTPATIENT
Start: 2019-10-30 | End: 2019-11-01

## 2019-10-30 RX ORDER — SODIUM CHLORIDE 9 MG/ML
1000 INJECTION, SOLUTION INTRAVENOUS
Refills: 0 | Status: DISCONTINUED | OUTPATIENT
Start: 2019-10-30 | End: 2019-11-02

## 2019-10-30 RX ORDER — ONDANSETRON 8 MG/1
4 TABLET, FILM COATED ORAL ONCE
Refills: 0 | Status: DISCONTINUED | OUTPATIENT
Start: 2019-10-30 | End: 2019-10-31

## 2019-10-30 RX ORDER — DIPHENHYDRAMINE HCL 50 MG
25 CAPSULE ORAL EVERY 4 HOURS
Refills: 0 | Status: DISCONTINUED | OUTPATIENT
Start: 2019-10-30 | End: 2019-11-01

## 2019-10-30 RX ORDER — ONDANSETRON 8 MG/1
4 TABLET, FILM COATED ORAL EVERY 6 HOURS
Refills: 0 | Status: DISCONTINUED | OUTPATIENT
Start: 2019-10-30 | End: 2019-11-02

## 2019-10-30 RX ORDER — HYDROMORPHONE HYDROCHLORIDE 2 MG/ML
1 INJECTION INTRAMUSCULAR; INTRAVENOUS; SUBCUTANEOUS
Refills: 0 | Status: DISCONTINUED | OUTPATIENT
Start: 2019-10-30 | End: 2019-10-31

## 2019-10-30 RX ORDER — ENOXAPARIN SODIUM 100 MG/ML
40 INJECTION SUBCUTANEOUS DAILY
Refills: 0 | Status: DISCONTINUED | OUTPATIENT
Start: 2019-10-31 | End: 2019-11-02

## 2019-10-30 RX ORDER — SODIUM CHLORIDE 9 MG/ML
1000 INJECTION, SOLUTION INTRAVENOUS ONCE
Refills: 0 | Status: COMPLETED | OUTPATIENT
Start: 2019-10-30 | End: 2019-10-30

## 2019-10-30 RX ADMIN — ONDANSETRON 4 MILLIGRAM(S): 8 TABLET, FILM COATED ORAL at 17:02

## 2019-10-30 RX ADMIN — SODIUM CHLORIDE 1000 MILLILITER(S): 9 INJECTION, SOLUTION INTRAVENOUS at 09:36

## 2019-10-30 RX ADMIN — SODIUM CHLORIDE 150 MILLILITER(S): 9 INJECTION, SOLUTION INTRAVENOUS at 19:24

## 2019-10-30 RX ADMIN — SODIUM CHLORIDE 150 MILLILITER(S): 9 INJECTION, SOLUTION INTRAVENOUS at 23:50

## 2019-10-30 RX ADMIN — SODIUM CHLORIDE 1000 MILLILITER(S): 9 INJECTION, SOLUTION INTRAVENOUS at 14:49

## 2019-10-30 RX ADMIN — ONDANSETRON 4 MILLIGRAM(S): 8 TABLET, FILM COATED ORAL at 09:36

## 2019-10-30 NOTE — ED ADULT NURSE NOTE - OBJECTIVE STATEMENT
pt received to room #1 with c/o nausea and vomiting. pt pmh of breast CA, no use of Left arm, last chemo 15yrs ago. pt was on vacation on a cruise when she began vomiting. had IV fluids placed on cruise ship. went to PMD and was told to come to ED to r/o SOB. pt states her last BM was Sunday. denies abd pain. abd distended, non tender, soft. IV placed, labs drawn and sent, medication infusing as per MDs orders.  at bedside, will cont to monitor.

## 2019-10-30 NOTE — H&P ADULT - NSHPPHYSICALEXAM_GEN_ALL_CORE
General: NAD, Pleasant  Neurology: Patient is AA&Ox4, follows commands, and speech fluent. EOMI intact, PERRLA, 3mm--2mm. Sensation in the Trigeminal distribution is wnl and symmetrical. Facial muscles intact and symmetrical. Hearing appropriate. Uvula rises equally upon phonation and tongue protrudes symmetrically. SCM/Trapezius 5/5 power.  Neck: Neck supple, trachea midline, No JVD  Respiratory: CTA B/L, (-)rales, rhonchi  CV: S1S2, r/r/r, (-)m/r/g  Abdomen: softly distended, BSx4; tympany to percussion x4; (+)RLQ tenderness  Extremities: 2+ peripheral pulses bilat throughout; (-)edema appreciated  Skin: No Rashes, Hematoma, Ecchymosis

## 2019-10-30 NOTE — PATIENT PROFILE ADULT - VISION (WITH CORRECTIVE LENSES IF THE PATIENT USUALLY WEARS THEM):
wear glasses for distance and reading/Normal vision: sees adequately in most situations; can see medication labels, newsprint

## 2019-10-30 NOTE — ED PROVIDER NOTE - CARE PLAN
Principal Discharge DX:	Intestinal obstruction, unspecified cause, unspecified whether partial or complete

## 2019-10-30 NOTE — ED PROVIDER NOTE - PHYSICAL EXAMINATION
GEN - NAD; well appearing; A+O x3   HEAD - NC/AT   EYES- PERRL, EOMI  ENT: Airway patent, mmm, Oral cavity and pharynx normal.   NECK: Neck supple  PULMONARY - CTA b/l, symmetric breath sounds.   CARDIAC -s1s2, RRR, no M,G,R  ABDOMEN - +BS, ND, NT, soft, no guarding, no rebound, no masses   BACK - no CVA tenderness, Normal  spine   EXTREMITIES - FROM  SKIN - no rash or bruising   NEUROLOGIC - alert, speech clear, no focal deficits  PSYCH -nl mood/affect, nl insight. GEN - NAD; well appearing; A+O x3   HEAD - NC/AT   EYES- PERRL, EOMI  ENT: Airway patent, mmm, Oral cavity and pharynx normal.   NECK: Neck supple  PULMONARY - CTA b/l, symmetric breath sounds.   CARDIAC -s1s2, RRR, no M,G,R  ABDOMEN - hypoactive bs, +ttp diffusely, no guarding, no rebound, no masses   BACK - no CVA tenderness, Normal  spine   EXTREMITIES - FROM  SKIN - no rash or bruising   NEUROLOGIC - alert, speech clear, no focal deficits  PSYCH -nl mood/affect, nl insight.

## 2019-10-30 NOTE — ED ADULT TRIAGE NOTE - CHIEF COMPLAINT QUOTE
Patient has c/o nausea and vomiting. Pt went on a cruise that was to last 8 days from [place] and on the third day she left to come back because of abdominal pain and above symptoms. Pt here to rule out SBO.

## 2019-10-30 NOTE — H&P ADULT - NSICDXPASTSURGICALHX_GEN_ALL_CORE_FT
PAST SURGICAL HISTORY:  H/O foot surgery s/p left ankle surgery- 2014    H/O mastectomy, bilateral with TRAM flap reconstruction-2003    H/O:      History of dilatation and curettage 2018    S/P BSO (bilateral salpingo-oophorectomy) 2004

## 2019-10-30 NOTE — ED ADULT NURSE NOTE - CHIEF COMPLAINT QUOTE
Patient has c/o nausea and vomiting. Pt went on a cruise that was to last 8 days from [place] and on the third day she left to come back because of abdominal pain and above symptoms. Pt here to rule out SBO.

## 2019-10-30 NOTE — H&P ADULT - NSHPLABSRESULTS_GEN_ALL_CORE
Vital Signs Last 24 Hrs  T(C): 36.7 (30 Oct 2019 13:09), Max: 36.7 (30 Oct 2019 08:17)  T(F): 98 (30 Oct 2019 13:09), Max: 98 (30 Oct 2019 08:17)  HR: 78 (30 Oct 2019 14:29) (72 - 84)  BP: 157/76 (30 Oct 2019 14:29) (133/64 - 157/76)  BP(mean): --  RR: 18 (30 Oct 2019 14:29) (18 - 18)  SpO2: 99% (30 Oct 2019 14:29) (99% - 99%)    10-30    134<L>  |  99  |  31<H>  ----------------------------<  107<H>  3.9   |  16<L>  |  0.94    Ca    9.7      30 Oct 2019 08:58    TPro  7.4  /  Alb  4.0  /  TBili  0.5  /  DBili  x   /  AST  29  /  ALT  17  /  AlkPhos  63  10-30                            13.0   11.87 )-----------( 237      ( 30 Oct 2019 08:58 )             40.7     < from: CT Abdomen and Pelvis w/ IV Cont (10.30.19 @ 11:32) >    FINDINGS:    LOWER CHEST: Small hiatal hernia. Minor atelectasis.    LIVER: Subcentimeter liver lesions, statistically likely cysts.  BILE DUCTS: Normal caliber.  GALLBLADDER: Within normal limits.  SPLEEN: Within normal limits.  PANCREAS: Within normal limits.  ADRENALS: Within normal limits.  KIDNEYS/URETERS: Bilateral renal cysts.    BLADDER: Within normal limits.  REPRODUCTIVE ORGANS: Hysterectomy.    BOWEL: Small bowel obstruction is identified in the right lower quadrant,   where tethering of several loops of small bowel is identified. There is   mild swirling of the mesentery with mesenteric edema and localized wall   thickening in several loops of distal ileum approximately 8 cm proximal   to the ileocecal valve. Crossing transition points are identified.   Findings raise suspicion for closed loop. (602:36 and 2:64-66)    No evidence of bowel wall pneumatosis. No portal or mesenteric venous gas.    PERITONEUM: Small amount of free fluid. No pneumoperitoneum.  VESSELS: Atherosclerotic calcifications.  RETROPERITONEUM/LYMPH NODES: No lymphadenopathy.    ABDOMINAL WALL: Postoperative changes.  BONES: Within normal limits.    IMPRESSION:     High-grade small bowel obstruction with transition point in the right   lower abdomen. Combination of findings raise suspicion for closed loop.   Careful clinical evaluation is warranted.

## 2019-10-30 NOTE — ED PROVIDER NOTE - OBJECTIVE STATEMENT
68 y/o f presents with abd pain. Started 5 days ago on day 2 of a cruise. Pain located diffusely to abdomen, constant, mild when not moving but worsens when she walk or moves around, a/w nausea and vomiting every time she tries to eat or drink. Last bm and gas passage 4 days ago. Denies fevers, chills, ha, cp, sob, dysuria, hematuria. Had hysterectomy past january for endometrial ca. Patient went to cruise md after symptoms started-was given iv abx without effect, so flew home to come here to hospital.

## 2019-10-30 NOTE — ED PROVIDER NOTE - PROGRESS NOTE DETAILS
surgery was consulted for ct findings-results d/w patient-surgery evaluated-admit to dr. lambert, ng tube placed by surgery, patient agreeable to plan.

## 2019-10-30 NOTE — H&P ADULT - NSICDXPASTMEDICALHX_GEN_ALL_CORE_FT
PAST MEDICAL HISTORY:  Breast cancer in female s/p chemo in 2002    DM (diabetes mellitus) Type 2 DM    Endometrial cancer     HTN (hypertension)     Hyperlipidemia, unspecified hyperlipidemia type     Pacemaker 01/19/2011-Last interrogation in 07/2018    Stricture and stenosis of cervix uteri

## 2019-10-30 NOTE — ED PROVIDER NOTE - NS ED ROS FT
ROS:  GENERAL: No fever, no chills  EYES: no change in vision  HEENT: no trouble swallowing, no trouble speaking  CARDIAC: no chest pain  PULMONARY: no cough, no shortness of breath  GI: + abdominal pain, + nausea, + vomiting, no diarrhea, no constipation  : No dysuria, no frequency, no change in appearance, or odor of urine  SKIN: no rashes  NEURO: no headache, no weakness  MSK: No joint pain

## 2019-10-30 NOTE — H&P ADULT - ATTENDING COMMENTS
Patient seen and examined  Labs and imaging reviewed  Risks, benefits, and alternatives discussed.  OR for ex-lap, RALPH, possible small bowel resection

## 2019-10-30 NOTE — H&P ADULT - ASSESSMENT
This is a 70 y/o woman with a medical history of HTN, HLD, Pre-DM, Endometrial Cancer s/p CHRISS, PLND on 1/2019 , breast cancer s/p mastectomy with TRAM flaps who now presents with abdominal pain and obstipation for the past 4 days and imaging revealing a closed loop small bowel obstruction.    -Admit to Surgery, Dr. Girard  -NPO  -IVF LR bolus 2L over 2 hours in additon to 1L LR already ordered by ED  -LR @ 150cc/hr  -For emergent exploratory laparotomy with possible bowel resection  -Seen and examined with Dr. Girard

## 2019-10-30 NOTE — H&P ADULT - HISTORY OF PRESENT ILLNESS
This is a 70 y/o woman with a medical history of HTN, HLD,Pre-DM,  endometrial Cancer s/p CHRISS, PLND with abdominal flap on 1/2019 and breast cancer s/p bilateral mestectomy with TRAM flaps who presents to the ED today with c/o worsening abdominal pain for the past 4 days.  Pt states that she was on a cruise which began on Friday and was in her [place] and on Saturday night she began to feel slight abdominal pain.  She went to the sick bay on the cruise and was given IV ABX for presumed GI infection.  She then noticed that she did not move her bowels or pass flatus since Sunday which is not her usual bowel pattern of daily BM's.  Pt continued to have increasing abdominal distention and increasingly noticeable hyperactive bowel sounds.  While on the cruise, it was suggested that she be admitted to a hospital in [facility], however pt decided against that and flew to the  overnight last night.  Pt has not moved her bowels since Sunday morning nor has she passed any flatus since then.  He denies nausea and vomiting, however she does state that she has had increased belching  In the ED a CT scan was obtained which revealed a closed loop SBO.  Surgery was consulted for further evaluation.

## 2019-10-30 NOTE — ED PROVIDER NOTE - CLINICAL SUMMARY MEDICAL DECISION MAKING FREE TEXT BOX
Patient presents to the ed with diffuse abd pain, nausea, vomiting, obstipation. VSS, nontoxic appearing. Plan for labs, ct imaging to eval for bowel obstruction, diverticulitis, colitis, fluids, antiemetics, pain ctrl prn (declining at this time). If surgical diagnosis on ct-patient requesting dr. marilyn patten.

## 2019-10-31 LAB
ANION GAP SERPL CALC-SCNC: 13 MMO/L — SIGNIFICANT CHANGE UP (ref 7–14)
ANION GAP SERPL CALC-SCNC: 14 MMO/L — SIGNIFICANT CHANGE UP (ref 7–14)
BACTERIA UR CULT: SIGNIFICANT CHANGE UP
BASOPHILS # BLD AUTO: 0.02 K/UL — SIGNIFICANT CHANGE UP (ref 0–0.2)
BASOPHILS NFR BLD AUTO: 0.1 % — SIGNIFICANT CHANGE UP (ref 0–2)
BUN SERPL-MCNC: 17 MG/DL — SIGNIFICANT CHANGE UP (ref 7–23)
BUN SERPL-MCNC: 20 MG/DL — SIGNIFICANT CHANGE UP (ref 7–23)
CALCIUM SERPL-MCNC: 8.4 MG/DL — SIGNIFICANT CHANGE UP (ref 8.4–10.5)
CALCIUM SERPL-MCNC: 8.4 MG/DL — SIGNIFICANT CHANGE UP (ref 8.4–10.5)
CHLORIDE SERPL-SCNC: 101 MMOL/L — SIGNIFICANT CHANGE UP (ref 98–107)
CHLORIDE SERPL-SCNC: 101 MMOL/L — SIGNIFICANT CHANGE UP (ref 98–107)
CO2 SERPL-SCNC: 21 MMOL/L — LOW (ref 22–31)
CO2 SERPL-SCNC: 23 MMOL/L — SIGNIFICANT CHANGE UP (ref 22–31)
CREAT SERPL-MCNC: 0.76 MG/DL — SIGNIFICANT CHANGE UP (ref 0.5–1.3)
CREAT SERPL-MCNC: 0.77 MG/DL — SIGNIFICANT CHANGE UP (ref 0.5–1.3)
EOSINOPHIL # BLD AUTO: 0 K/UL — SIGNIFICANT CHANGE UP (ref 0–0.5)
EOSINOPHIL NFR BLD AUTO: 0 % — SIGNIFICANT CHANGE UP (ref 0–6)
GLUCOSE SERPL-MCNC: 112 MG/DL — HIGH (ref 70–99)
GLUCOSE SERPL-MCNC: 113 MG/DL — HIGH (ref 70–99)
HCT VFR BLD CALC: 35.4 % — SIGNIFICANT CHANGE UP (ref 34.5–45)
HCV AB S/CO SERPL IA: 0.17 S/CO — SIGNIFICANT CHANGE UP (ref 0–0.99)
HCV AB SERPL-IMP: SIGNIFICANT CHANGE UP
HGB BLD-MCNC: 11.2 G/DL — LOW (ref 11.5–15.5)
IMM GRANULOCYTES NFR BLD AUTO: 0.6 % — SIGNIFICANT CHANGE UP (ref 0–1.5)
LYMPHOCYTES # BLD AUTO: 0.51 K/UL — LOW (ref 1–3.3)
LYMPHOCYTES # BLD AUTO: 3.6 % — LOW (ref 13–44)
MAGNESIUM SERPL-MCNC: 1.7 MG/DL — SIGNIFICANT CHANGE UP (ref 1.6–2.6)
MCHC RBC-ENTMCNC: 25.5 PG — LOW (ref 27–34)
MCHC RBC-ENTMCNC: 31.6 % — LOW (ref 32–36)
MCV RBC AUTO: 80.6 FL — SIGNIFICANT CHANGE UP (ref 80–100)
MONOCYTES # BLD AUTO: 0.58 K/UL — SIGNIFICANT CHANGE UP (ref 0–0.9)
MONOCYTES NFR BLD AUTO: 4.1 % — SIGNIFICANT CHANGE UP (ref 2–14)
NEUTROPHILS # BLD AUTO: 13.06 K/UL — HIGH (ref 1.8–7.4)
NEUTROPHILS NFR BLD AUTO: 91.6 % — HIGH (ref 43–77)
NRBC # FLD: 0 K/UL — SIGNIFICANT CHANGE UP (ref 0–0)
PHOSPHATE SERPL-MCNC: 2.8 MG/DL — SIGNIFICANT CHANGE UP (ref 2.5–4.5)
PLATELET # BLD AUTO: 218 K/UL — SIGNIFICANT CHANGE UP (ref 150–400)
PMV BLD: 12.9 FL — SIGNIFICANT CHANGE UP (ref 7–13)
POTASSIUM SERPL-MCNC: 3 MMOL/L — LOW (ref 3.5–5.3)
POTASSIUM SERPL-MCNC: 3.8 MMOL/L — SIGNIFICANT CHANGE UP (ref 3.5–5.3)
POTASSIUM SERPL-SCNC: 3 MMOL/L — LOW (ref 3.5–5.3)
POTASSIUM SERPL-SCNC: 3.8 MMOL/L — SIGNIFICANT CHANGE UP (ref 3.5–5.3)
RBC # BLD: 4.39 M/UL — SIGNIFICANT CHANGE UP (ref 3.8–5.2)
RBC # FLD: 15.8 % — HIGH (ref 10.3–14.5)
SODIUM SERPL-SCNC: 135 MMOL/L — SIGNIFICANT CHANGE UP (ref 135–145)
SODIUM SERPL-SCNC: 138 MMOL/L — SIGNIFICANT CHANGE UP (ref 135–145)
SPECIMEN SOURCE: SIGNIFICANT CHANGE UP
WBC # BLD: 14.26 K/UL — HIGH (ref 3.8–10.5)
WBC # FLD AUTO: 14.26 K/UL — HIGH (ref 3.8–10.5)

## 2019-10-31 RX ORDER — NEBIVOLOL HYDROCHLORIDE 5 MG/1
10 TABLET ORAL DAILY
Refills: 0 | Status: DISCONTINUED | OUTPATIENT
Start: 2019-10-31 | End: 2019-10-31

## 2019-10-31 RX ORDER — METOPROLOL TARTRATE 50 MG
5 TABLET ORAL EVERY 6 HOURS
Refills: 0 | Status: DISCONTINUED | OUTPATIENT
Start: 2019-10-31 | End: 2019-11-02

## 2019-10-31 RX ORDER — POTASSIUM CHLORIDE 20 MEQ
10 PACKET (EA) ORAL
Refills: 0 | Status: COMPLETED | OUTPATIENT
Start: 2019-10-31 | End: 2019-10-31

## 2019-10-31 RX ORDER — KETOROLAC TROMETHAMINE 30 MG/ML
15 SYRINGE (ML) INJECTION EVERY 6 HOURS
Refills: 0 | Status: DISCONTINUED | OUTPATIENT
Start: 2019-10-31 | End: 2019-11-02

## 2019-10-31 RX ORDER — SPIRONOLACTONE 25 MG/1
25 TABLET, FILM COATED ORAL DAILY
Refills: 0 | Status: DISCONTINUED | OUTPATIENT
Start: 2019-10-31 | End: 2019-10-31

## 2019-10-31 RX ORDER — POTASSIUM CHLORIDE 20 MEQ
10 PACKET (EA) ORAL
Refills: 0 | Status: DISCONTINUED | OUTPATIENT
Start: 2019-10-31 | End: 2019-10-31

## 2019-10-31 RX ORDER — ACETAMINOPHEN 500 MG
1000 TABLET ORAL ONCE
Refills: 0 | Status: DISCONTINUED | OUTPATIENT
Start: 2019-10-31 | End: 2019-10-31

## 2019-10-31 RX ORDER — AMLODIPINE BESYLATE 2.5 MG/1
5 TABLET ORAL AT BEDTIME
Refills: 0 | Status: DISCONTINUED | OUTPATIENT
Start: 2019-10-31 | End: 2019-10-31

## 2019-10-31 RX ORDER — MAGNESIUM SULFATE 500 MG/ML
1 VIAL (ML) INJECTION ONCE
Refills: 0 | Status: COMPLETED | OUTPATIENT
Start: 2019-10-31 | End: 2019-10-31

## 2019-10-31 RX ORDER — LISINOPRIL 2.5 MG/1
40 TABLET ORAL DAILY
Refills: 0 | Status: DISCONTINUED | OUTPATIENT
Start: 2019-10-31 | End: 2019-10-31

## 2019-10-31 RX ORDER — MORPHINE SULFATE 50 MG/1
2 CAPSULE, EXTENDED RELEASE ORAL EVERY 4 HOURS
Refills: 0 | Status: DISCONTINUED | OUTPATIENT
Start: 2019-10-31 | End: 2019-10-31

## 2019-10-31 RX ORDER — ACETAMINOPHEN 500 MG
1000 TABLET ORAL EVERY 6 HOURS
Refills: 0 | Status: COMPLETED | OUTPATIENT
Start: 2019-10-31 | End: 2019-11-01

## 2019-10-31 RX ORDER — POTASSIUM PHOSPHATE, MONOBASIC POTASSIUM PHOSPHATE, DIBASIC 236; 224 MG/ML; MG/ML
15 INJECTION, SOLUTION INTRAVENOUS ONCE
Refills: 0 | Status: COMPLETED | OUTPATIENT
Start: 2019-10-31 | End: 2019-10-31

## 2019-10-31 RX ADMIN — HYDROMORPHONE HYDROCHLORIDE 30 MILLILITER(S): 2 INJECTION INTRAMUSCULAR; INTRAVENOUS; SUBCUTANEOUS at 08:15

## 2019-10-31 RX ADMIN — SODIUM CHLORIDE 100 MILLILITER(S): 9 INJECTION, SOLUTION INTRAVENOUS at 18:15

## 2019-10-31 RX ADMIN — Medication 15 MILLIGRAM(S): at 19:09

## 2019-10-31 RX ADMIN — Medication 400 MILLIGRAM(S): at 05:06

## 2019-10-31 RX ADMIN — POTASSIUM PHOSPHATE, MONOBASIC POTASSIUM PHOSPHATE, DIBASIC 62.5 MILLIMOLE(S): 236; 224 INJECTION, SOLUTION INTRAVENOUS at 18:15

## 2019-10-31 RX ADMIN — Medication 1000 MILLIGRAM(S): at 19:09

## 2019-10-31 RX ADMIN — Medication 100 GRAM(S): at 18:14

## 2019-10-31 RX ADMIN — HYDROMORPHONE HYDROCHLORIDE 30 MILLILITER(S): 2 INJECTION INTRAMUSCULAR; INTRAVENOUS; SUBCUTANEOUS at 20:07

## 2019-10-31 RX ADMIN — Medication 400 MILLIGRAM(S): at 12:49

## 2019-10-31 RX ADMIN — Medication 1000 MILLIGRAM(S): at 05:36

## 2019-10-31 RX ADMIN — Medication 15 MILLIGRAM(S): at 12:48

## 2019-10-31 RX ADMIN — ENOXAPARIN SODIUM 40 MILLIGRAM(S): 100 INJECTION SUBCUTANEOUS at 12:47

## 2019-10-31 RX ADMIN — Medication 400 MILLIGRAM(S): at 18:12

## 2019-10-31 RX ADMIN — HYDROMORPHONE HYDROCHLORIDE 30 MILLILITER(S): 2 INJECTION INTRAMUSCULAR; INTRAVENOUS; SUBCUTANEOUS at 00:50

## 2019-10-31 RX ADMIN — HYDROMORPHONE HYDROCHLORIDE 30 MILLILITER(S): 2 INJECTION INTRAMUSCULAR; INTRAVENOUS; SUBCUTANEOUS at 04:46

## 2019-10-31 RX ADMIN — Medication 15 MILLIGRAM(S): at 18:15

## 2019-10-31 RX ADMIN — Medication 100 MILLIEQUIVALENT(S): at 03:13

## 2019-10-31 RX ADMIN — Medication 100 MILLIEQUIVALENT(S): at 07:36

## 2019-10-31 RX ADMIN — Medication 100 MILLIEQUIVALENT(S): at 04:41

## 2019-10-31 RX ADMIN — Medication 15 MILLIGRAM(S): at 13:47

## 2019-10-31 RX ADMIN — Medication 1000 MILLIGRAM(S): at 13:48

## 2019-10-31 NOTE — PROGRESS NOTE ADULT - ASSESSMENT
69y Female who is now several hours post-op from a  Ex lap, RALPH, closed loop internal hernia repair for a closed loop SBO    Plan:  - Pain control with PCA  - NPO/ NGT/ IVF  - DVT ppx  - OOB and ambulating as tolerated

## 2019-10-31 NOTE — CHART NOTE - NSCHARTNOTEFT_GEN_A_CORE
POST-OPERATIVE NOTE    Subjective:  Patient is s/p Ex lap, RALPH, closed loop internal hernia repair . Recovering appropriately. Denies n/v. Pain controlled with PCA. NGT in place functional. Nix making urine.    Vital Signs Last 24 Hrs  T(C): 36.9 (31 Oct 2019 03:00), Max: 36.9 (31 Oct 2019 03:00)  T(F): 98.4 (31 Oct 2019 03:00), Max: 98.4 (31 Oct 2019 03:00)  HR: 71 (31 Oct 2019 04:00) (70 - 84)  BP: 122/83 (31 Oct 2019 03:45) (116/65 - 157/76)  BP(mean): 90 (31 Oct 2019 03:45) (75 - 90)  RR: 14 (31 Oct 2019 04:00) (12 - 19)  SpO2: 98% (31 Oct 2019 04:00) (96% - 100%)  I&O's Detail    30 Oct 2019 07:01  -  31 Oct 2019 04:10  --------------------------------------------------------  IN:    lactated ringers.: 450 mL  Total IN: 450 mL    OUT:    Indwelling Catheter - Urethral: 225 mL    Voided: 100 mL  Total OUT: 325 mL    Total NET: 125 mL        enoxaparin Injectable 40    PAST MEDICAL & SURGICAL HISTORY:  Endometrial cancer  Stricture and stenosis of cervix uteri  Breast cancer in female: s/p chemo in   Hyperlipidemia, unspecified hyperlipidemia type  HTN (hypertension)  Pacemaker: 2011-Last interrogation in 2018  DM (diabetes mellitus): Type 2 DM  History of dilatation and curettage: 2018  S/P BSO (bilateral salpingo-oophorectomy): 2004  H/O foot surgery: s/p left ankle surgery- 2014  H/O mastectomy, bilateral: with TRAM flap reconstruction-2003  H/O:         Physical Exam:  General: NAD, resting comfortably in bed  Pulmonary: Nonlabored breathing, no respiratory distress  Cardiovascular: NSR  Abdominal: mildly firm, nontender, mildly distended. incisions c/d/i. NGT in place  : Nix  Extremities: WWP      LABS:                        13.0   11.87 )-----------( 237      ( 30 Oct 2019 08:58 )             40.7     10    135  |  101  |  20  ----------------------------<  112<H>  3.0<L>   |  21<L>  |  0.77    Ca    8.4      31 Oct 2019 00:50  Phos  2.8     10-31  Mg     1.7     10-31    TPro  7.4  /  Alb  4.0  /  TBili  0.5  /  DBili  x   /  AST  29  /  ALT  17  /  AlkPhos  63  10-30    PT/INR - ( 30 Oct 2019 13:23 )   PT: 13.2 SEC;   INR: 1.18          PTT - ( 30 Oct 2019 13:23 )  PTT:27.2 SEC  CAPILLARY BLOOD GLUCOSE          Radiology and Additional Studies:    Assessment:  The patient is a 69y Female who is now several hours post-op from a  Ex lap, RALPH, closed loop internal hernia repair for a closed loop SBO    Plan:  - Pain control with PCA  - NPO/ NGT/ IVF  - DVT ppx  - OOB and ambulating as tolerated  - F/u AM labs

## 2019-10-31 NOTE — PHYSICAL THERAPY INITIAL EVALUATION ADULT - GENERAL OBSERVATIONS, REHAB EVAL
Patient received semisupine in bed, +antunez, +ng tube, +IV, in no apparent distress.  present. Patient agreeable to participate in physical therapy evaluation.

## 2019-10-31 NOTE — PROGRESS NOTE ADULT - ATTENDING COMMENTS
Patient seen and examined  She is feeling much better  Denies any nausea or vomiting  Unsure if she passed flatus  States abdominal pain significantly improved    On exam: awake, alert and oriented  Breathing comfortably on room air  Moving all extremities  Abd is soft, less distended, appropriately tender  NGT in place    - Continue NGT to wall suction  - Will consider clamp trial in AM depending on NGT output overnight

## 2019-10-31 NOTE — PROGRESS NOTE ADULT - SUBJECTIVE AND OBJECTIVE BOX
ANESTHESIA POSTOP CHECK    69y Female POSTOP DAY 1      Vital Signs Last 24 Hrs  T(C): 36.6 (31 Oct 2019 14:09), Max: 36.9 (31 Oct 2019 03:00)  T(F): 97.8 (31 Oct 2019 14:09), Max: 98.4 (31 Oct 2019 03:00)  HR: 70 (31 Oct 2019 14:09) (68 - 82)  BP: 106/72 (31 Oct 2019 14:09) (106/72 - 157/76)  BP(mean): 90 (31 Oct 2019 03:45) (75 - 90)  RR: 16 (31 Oct 2019 14:09) (12 - 19)  SpO2: 100% (31 Oct 2019 14:09) (96% - 100%)  I&O's Summary    30 Oct 2019 07:01  -  31 Oct 2019 07:00  --------------------------------------------------------  IN: 1100 mL / OUT: 885 mL / NET: 215 mL    31 Oct 2019 07:01  -  31 Oct 2019 14:12  --------------------------------------------------------  IN: 600 mL / OUT: 600 mL / NET: 0 mL        [X ] NO APPARENT ANESTHESIA COMPLICATIONS      Comments:

## 2019-10-31 NOTE — PROGRESS NOTE ADULT - SUBJECTIVE AND OBJECTIVE BOX
Anesthesia Pain Management Service    SUBJECTIVE: Pt doing well with IV PCA without problems reported.    Therapy:	  [ X] IV PCA	   [ ] Epidural           [ ] s/p Spinal Opoid              [ ] Postpartum infusion	  [ ] Patient controlled regional anesthesia (PCRA)    [ ] prn Analgesics    Allergies    Celebrex (Nausea; Hives)  Demerol HCl (Nausea)    Intolerances      MEDICATIONS  (STANDING):  acetaminophen  IVPB .. 1000 milliGRAM(s) IV Intermittent every 6 hours  enalaprilat Injectable 2.5 milliGRAM(s) IV Push every 6 hours  enoxaparin Injectable 40 milliGRAM(s) SubCutaneous daily  HYDROmorphone PCA (1 mG/mL) 30 milliLiter(s) PCA Continuous PCA Continuous  ketorolac   Injectable 15 milliGRAM(s) IV Push every 6 hours  lactated ringers. 1000 milliLiter(s) (100 mL/Hr) IV Continuous <Continuous>  magnesium sulfate  IVPB 1 Gram(s) IV Intermittent once  metoprolol tartrate Injectable 5 milliGRAM(s) IV Push every 6 hours  potassium phosphate IVPB 15 milliMole(s) IV Intermittent once    MEDICATIONS  (PRN):  diphenhydrAMINE   Injectable 25 milliGRAM(s) IV Push every 4 hours PRN Pruritus  HYDROmorphone PCA (1 mG/mL) Rescue Clinician Bolus 0.5 milliGRAM(s) IV Push every 15 minutes PRN for Pain Scale GREATER THAN 6  naloxone Injectable 0.1 milliGRAM(s) IV Push every 3 minutes PRN For ANY of the following changes in patient status:  A. RR LESS THAN 10 breaths per minute, B. Oxygen saturation LESS THAN 90%, C. Sedation score of 6  ondansetron Injectable 4 milliGRAM(s) IV Push every 6 hours PRN Nausea      OBJECTIVE:   [X] No new signs     [ ] Other:    Side Effects:  [X ] None			[ ] Other:    Assessment of Catheter Site:		[ ] Intact		[ ] Other:    ASSESSMENT/PLAN  [ X] Continue current therapy. Recommend non-opioid adjuvant analgesics to be used when possible and when allowed by primary surgical team. NGT in place.    [ ] Therapy changed to:    [ ] IV PCA       [ ] Epidural     [ ] prn Analgesics     Comments:    Progress Note written now but Patient was seen earlier.

## 2019-10-31 NOTE — PHYSICAL THERAPY INITIAL EVALUATION ADULT - ADDITIONAL COMMENTS
Patient lives in a house with 2 steps to enter. One flight of stairs within. Patient lives with  and reports independence prior to admission.     Patient left seated in bedside chair, all lines intact and RN aware.

## 2019-10-31 NOTE — PHYSICAL THERAPY INITIAL EVALUATION ADULT - PATIENT PROFILE REVIEW, REHAB EVAL
PT orders received: No formal activity order. Consult with DIXIE Ocampo, patient may participate in PT evaluation./yes

## 2019-10-31 NOTE — PHYSICAL THERAPY INITIAL EVALUATION ADULT - PERTINENT HX OF CURRENT PROBLEM, REHAB EVAL
Patient is a 69 year old female presenting with abdominal pain and obstipation for 4 days and imaging revealing a closed loop small bowel obstruction. PMH: HTN, HLD, Pre-DM, Endometrial Cancer s/p CHRISS, PLND on 1/2019 , breast cancer s/p mastectomy with TRAM flaps.

## 2019-10-31 NOTE — PHYSICAL THERAPY INITIAL EVALUATION ADULT - MANUAL MUSCLE TESTING RESULTS, REHAB EVAL
Not formally assessed; bilateral lower extremities grossly 3+/5 as assessed through sit to stand, bilateral upper extremities grossly 3/5 as assessed through functional mobility

## 2019-10-31 NOTE — BRIEF OPERATIVE NOTE - OPERATION/FINDINGS
Exploratory laparotomy, adhesive band causing internal hernia into which loop of bowel was caught.   No resection of bowel. All bowel pink and viable

## 2019-10-31 NOTE — PROGRESS NOTE ADULT - SUBJECTIVE AND OBJECTIVE BOX
Anesthesia Pain Management Service    SUBJECTIVE: Patient is doing well with IV PCA and no significant problems reported.    Pain Scale Score	At rest: _3__ 	With Activity: ___ 	[X ] Refer to charted pain scores    THERAPY:    [ ] IV PCA Morphine		[ ] 5 mg/mL	[ ] 1 mg/mL  [X ] IV PCA Hydromorphone	[ ] 5 mg/mL	[X ] 1 mg/mL  [ ] IV PCA Fentanyl		[ ] 50 micrograms/mL    Demand dose __0.2_ lockout __6_ (minutes) Continuous Rate _0__ Total: _1.2__  mg used (in past 24 hours)      MEDICATIONS  (STANDING):  acetaminophen  IVPB .. 1000 milliGRAM(s) IV Intermittent every 6 hours  enalaprilat Injectable 2.5 milliGRAM(s) IV Push every 6 hours  enoxaparin Injectable 40 milliGRAM(s) SubCutaneous daily  HYDROmorphone PCA (1 mG/mL) 30 milliLiter(s) PCA Continuous PCA Continuous  ketorolac   Injectable 15 milliGRAM(s) IV Push every 6 hours  lactated ringers. 1000 milliLiter(s) (100 mL/Hr) IV Continuous <Continuous>  metoprolol tartrate Injectable 5 milliGRAM(s) IV Push every 6 hours    MEDICATIONS  (PRN):  diphenhydrAMINE   Injectable 25 milliGRAM(s) IV Push every 4 hours PRN Pruritus  HYDROmorphone PCA (1 mG/mL) Rescue Clinician Bolus 0.5 milliGRAM(s) IV Push every 15 minutes PRN for Pain Scale GREATER THAN 6  naloxone Injectable 0.1 milliGRAM(s) IV Push every 3 minutes PRN For ANY of the following changes in patient status:  A. RR LESS THAN 10 breaths per minute, B. Oxygen saturation LESS THAN 90%, C. Sedation score of 6  ondansetron Injectable 4 milliGRAM(s) IV Push every 6 hours PRN Nausea      OBJECTIVE: sitting in chair     Sedation Score:	[ X] Alert	[ ] Drowsy 	[ ] Arousable	[ ] Asleep	[ ] Unresponsive    Side Effects:	[X ] None	[ ] Nausea	[ ] Vomiting	[ ] Pruritus  		[ ] Other:    Vital Signs Last 24 Hrs  T(C): 36.6 (31 Oct 2019 12:46), Max: 36.9 (31 Oct 2019 03:00)  T(F): 97.9 (31 Oct 2019 12:46), Max: 98.4 (31 Oct 2019 03:00)  HR: 73 (31 Oct 2019 12:46) (68 - 82)  BP: 108/74 (31 Oct 2019 12:46) (108/74 - 157/76)  BP(mean): 90 (31 Oct 2019 03:45) (75 - 90)  RR: 15 (31 Oct 2019 12:46) (12 - 19)  SpO2: 100% (31 Oct 2019 12:46) (96% - 100%)    ASSESSMENT/ PLAN    Therapy to  be:	[ X] Continue   [ ] Discontinued   [ ] Change to prn Analgesics    Documentation and Verification of current medications:   [X] Done	[ ] Not done, not elligible    Comments: NPO continue current therapy     Progress Note written now but Patient was seen earlier.

## 2019-10-31 NOTE — PROGRESS NOTE ADULT - SUBJECTIVE AND OBJECTIVE BOX
A Team Surgery AM Progress Note    Subjective: Patient is POD 0 s/p Ex lap, RALPH, closed loop internal hernia repair . Recovering appropriately. Denies n/v. Patient complaining of nausea and dry heaving, received zofran overnight. Complaining of appropriate post surgical abdominal discomfort.    Vital Signs Last 24 Hrs  T(C): 36.8 (31 Oct 2019 04:55), Max: 36.9 (31 Oct 2019 03:00)  T(F): 98.2 (31 Oct 2019 04:55), Max: 98.4 (31 Oct 2019 03:00)  HR: 68 (31 Oct 2019 04:55) (68 - 82)  BP: 114/73 (31 Oct 2019 04:55) (114/73 - 157/76)  BP(mean): 90 (31 Oct 2019 03:45) (75 - 90)  RR: 15 (31 Oct 2019 04:55) (12 - 19)  SpO2: 98% (31 Oct 2019 04:55) (96% - 100%)      General Appearance: Appears well, NAD  Neck: Supple  Chest: Equal expansion bilaterally, equal breath sounds  CV: Pulse regular presently  Abdomen: Soft, nontender, mildly distended, appropriate incisional tenderness, ANTHONY drain in LLQ dressings clean and dry and intact  Extremities: Grossly symmetric, SCD's in place     I&O's Summary    30 Oct 2019 07:  -  31 Oct 2019 07:00  --------------------------------------------------------  IN: 1100 mL / OUT: 885 mL / NET: 215 mL      I&O's Detail    30 Oct 2019 07:  -  31 Oct 2019 07:00  --------------------------------------------------------  IN:    IV PiggyBack: 200 mL    lactated ringers.: 900 mL  Total IN: 1100 mL    OUT:    Indwelling Catheter - Urethral: 585 mL    Nasoenteral Tube: 200 mL    Voided: 100 mL  Total OUT: 885 mL    Total NET: 215 mL          MEDICATIONS  (STANDING):  acetaminophen  IVPB .. 1000 milliGRAM(s) IV Intermittent every 6 hours  enoxaparin Injectable 40 milliGRAM(s) SubCutaneous daily  HYDROmorphone PCA (1 mG/mL) 30 milliLiter(s) PCA Continuous PCA Continuous  lactated ringers. 1000 milliLiter(s) (150 mL/Hr) IV Continuous <Continuous>  potassium chloride  10 mEq/100 mL IVPB 10 milliEquivalent(s) IV Intermittent every 1 hour    MEDICATIONS  (PRN):  diphenhydrAMINE   Injectable 25 milliGRAM(s) IV Push every 4 hours PRN Pruritus  HYDROmorphone PCA (1 mG/mL) Rescue Clinician Bolus 0.5 milliGRAM(s) IV Push every 15 minutes PRN for Pain Scale GREATER THAN 6  naloxone Injectable 0.1 milliGRAM(s) IV Push every 3 minutes PRN For ANY of the following changes in patient status:  A. RR LESS THAN 10 breaths per minute, B. Oxygen saturation LESS THAN 90%, C. Sedation score of 6  ondansetron Injectable 4 milliGRAM(s) IV Push every 6 hours PRN Nausea      LABS:                        11.2   14.26 )-----------( 218      ( 31 Oct 2019 06:55 )             35.4     10    138  |  101  |  17  ----------------------------<  113<H>  3.8   |  23  |  0.76    Ca    8.4      31 Oct 2019 06:55  Phos  2.8     10  Mg     1.7     10-31    TPro  7.4  /  Alb  4.0  /  TBili  0.5  /  DBili  x   /  AST  29  /  ALT  17  /  AlkPhos  63  10-30    PT/INR - ( 30 Oct 2019 13:23 )   PT: 13.2 SEC;   INR: 1.18          PTT - ( 30 Oct 2019 13:23 )  PTT:27.2 SEC  Urinalysis Basic - ( 30 Oct 2019 12:08 )    Color: LIGHT YELLOW / Appearance: CLEAR / S.029 / pH: 6.5  Gluc: NEGATIVE / Ketone: SMALL  / Bili: NEGATIVE / Urobili: NORMAL   Blood: SMALL / Protein: NEGATIVE / Nitrite: NEGATIVE   Leuk Esterase: NEGATIVE / RBC: 3-5 / WBC 0-2   Sq Epi: OCC / Non Sq Epi: x / Bacteria: NEGATIVE        RADIOLOGY & ADDITIONAL STUDIES:

## 2019-11-01 LAB
ANION GAP SERPL CALC-SCNC: 12 MMO/L — SIGNIFICANT CHANGE UP (ref 7–14)
ANION GAP SERPL CALC-SCNC: 14 MMO/L — SIGNIFICANT CHANGE UP (ref 7–14)
BUN SERPL-MCNC: 20 MG/DL — SIGNIFICANT CHANGE UP (ref 7–23)
BUN SERPL-MCNC: 22 MG/DL — SIGNIFICANT CHANGE UP (ref 7–23)
CALCIUM SERPL-MCNC: 8.6 MG/DL — SIGNIFICANT CHANGE UP (ref 8.4–10.5)
CALCIUM SERPL-MCNC: 9.2 MG/DL — SIGNIFICANT CHANGE UP (ref 8.4–10.5)
CHLORIDE SERPL-SCNC: 100 MMOL/L — SIGNIFICANT CHANGE UP (ref 98–107)
CHLORIDE SERPL-SCNC: 97 MMOL/L — LOW (ref 98–107)
CO2 SERPL-SCNC: 25 MMOL/L — SIGNIFICANT CHANGE UP (ref 22–31)
CO2 SERPL-SCNC: 28 MMOL/L — SIGNIFICANT CHANGE UP (ref 22–31)
CREAT SERPL-MCNC: 0.76 MG/DL — SIGNIFICANT CHANGE UP (ref 0.5–1.3)
CREAT SERPL-MCNC: 0.8 MG/DL — SIGNIFICANT CHANGE UP (ref 0.5–1.3)
GLUCOSE SERPL-MCNC: 91 MG/DL — SIGNIFICANT CHANGE UP (ref 70–99)
GLUCOSE SERPL-MCNC: 94 MG/DL — SIGNIFICANT CHANGE UP (ref 70–99)
HCT VFR BLD CALC: 33.6 % — LOW (ref 34.5–45)
HGB BLD-MCNC: 10.6 G/DL — LOW (ref 11.5–15.5)
MAGNESIUM SERPL-MCNC: 2.2 MG/DL — SIGNIFICANT CHANGE UP (ref 1.6–2.6)
MCHC RBC-ENTMCNC: 25.7 PG — LOW (ref 27–34)
MCHC RBC-ENTMCNC: 31.5 % — LOW (ref 32–36)
MCV RBC AUTO: 81.4 FL — SIGNIFICANT CHANGE UP (ref 80–100)
NRBC # FLD: 0 K/UL — SIGNIFICANT CHANGE UP (ref 0–0)
PHOSPHATE SERPL-MCNC: 3 MG/DL — SIGNIFICANT CHANGE UP (ref 2.5–4.5)
PLATELET # BLD AUTO: 209 K/UL — SIGNIFICANT CHANGE UP (ref 150–400)
PMV BLD: 13.2 FL — HIGH (ref 7–13)
POTASSIUM SERPL-MCNC: 3.3 MMOL/L — LOW (ref 3.5–5.3)
POTASSIUM SERPL-MCNC: 3.5 MMOL/L — SIGNIFICANT CHANGE UP (ref 3.5–5.3)
POTASSIUM SERPL-SCNC: 3.3 MMOL/L — LOW (ref 3.5–5.3)
POTASSIUM SERPL-SCNC: 3.5 MMOL/L — SIGNIFICANT CHANGE UP (ref 3.5–5.3)
RBC # BLD: 4.13 M/UL — SIGNIFICANT CHANGE UP (ref 3.8–5.2)
RBC # FLD: 16.1 % — HIGH (ref 10.3–14.5)
SODIUM SERPL-SCNC: 136 MMOL/L — SIGNIFICANT CHANGE UP (ref 135–145)
SODIUM SERPL-SCNC: 140 MMOL/L — SIGNIFICANT CHANGE UP (ref 135–145)
WBC # BLD: 9.02 K/UL — SIGNIFICANT CHANGE UP (ref 3.8–10.5)
WBC # FLD AUTO: 9.02 K/UL — SIGNIFICANT CHANGE UP (ref 3.8–10.5)

## 2019-11-01 RX ORDER — HYDROMORPHONE HYDROCHLORIDE 2 MG/ML
0.25 INJECTION INTRAMUSCULAR; INTRAVENOUS; SUBCUTANEOUS
Refills: 0 | Status: DISCONTINUED | OUTPATIENT
Start: 2019-11-01 | End: 2019-11-02

## 2019-11-01 RX ORDER — POTASSIUM CHLORIDE 20 MEQ
10 PACKET (EA) ORAL
Refills: 0 | Status: COMPLETED | OUTPATIENT
Start: 2019-11-01 | End: 2019-11-01

## 2019-11-01 RX ORDER — ACETAMINOPHEN 500 MG
1000 TABLET ORAL ONCE
Refills: 0 | Status: COMPLETED | OUTPATIENT
Start: 2019-11-01 | End: 2019-11-01

## 2019-11-01 RX ADMIN — Medication 15 MILLIGRAM(S): at 20:03

## 2019-11-01 RX ADMIN — Medication 1000 MILLIGRAM(S): at 13:35

## 2019-11-01 RX ADMIN — Medication 5 MILLIGRAM(S): at 19:35

## 2019-11-01 RX ADMIN — Medication 2.5 MILLIGRAM(S): at 23:25

## 2019-11-01 RX ADMIN — Medication 100 MILLIEQUIVALENT(S): at 12:38

## 2019-11-01 RX ADMIN — ENOXAPARIN SODIUM 40 MILLIGRAM(S): 100 INJECTION SUBCUTANEOUS at 12:38

## 2019-11-01 RX ADMIN — Medication 15 MILLIGRAM(S): at 00:44

## 2019-11-01 RX ADMIN — Medication 15 MILLIGRAM(S): at 19:33

## 2019-11-01 RX ADMIN — Medication 15 MILLIGRAM(S): at 06:12

## 2019-11-01 RX ADMIN — Medication 15 MILLIGRAM(S): at 05:42

## 2019-11-01 RX ADMIN — Medication 100 MILLIEQUIVALENT(S): at 15:11

## 2019-11-01 RX ADMIN — Medication 15 MILLIGRAM(S): at 00:18

## 2019-11-01 RX ADMIN — Medication 2.5 MILLIGRAM(S): at 19:32

## 2019-11-01 RX ADMIN — Medication 1000 MILLIGRAM(S): at 00:44

## 2019-11-01 RX ADMIN — Medication 15 MILLIGRAM(S): at 13:36

## 2019-11-01 RX ADMIN — Medication 5 MILLIGRAM(S): at 23:25

## 2019-11-01 RX ADMIN — Medication 400 MILLIGRAM(S): at 12:37

## 2019-11-01 RX ADMIN — Medication 400 MILLIGRAM(S): at 00:18

## 2019-11-01 RX ADMIN — Medication 100 MILLIEQUIVALENT(S): at 10:00

## 2019-11-01 RX ADMIN — Medication 15 MILLIGRAM(S): at 13:00

## 2019-11-01 NOTE — PROGRESS NOTE ADULT - ASSESSMENT
PHYSICAL EXAM:  T(F): 98.3 (07-20 @ 07:41)  HR: 76 (07-20 @ 07:47)  BP: 110/53 (07-20 @ 07:47)  RR: 18 (07-20 @ 07:41)  Constitutional: AAOx3, NAD  Abdomen: Soft, gravid, nontender, no palpable ctx 70 y/o woman with a medical history of HTN, HLD,Pre-DM, endometrial Cancer s/p CHRISS, PLND with abdominal flap on 1/2019 and breast cancer s/p bilateral mastectomy with TRAM flaps who presented with a closed loop bowel obstruction, now POD1 from ex-lap, RALPH, and internal hernia repair (10/31/2019).     - NGT Clamp Trial   - Pain control with PCA  - Diet: NPO  - DVT ppx  - OOB and ambulating as tolerated      J Team A Pager: #[phone]

## 2019-11-01 NOTE — PROGRESS NOTE ADULT - SUBJECTIVE AND OBJECTIVE BOX
Anesthesia Pain Management Service    SUBJECTIVE: Patient stated she pressed the IV PCA by accident last night thinking it was th call button.  She has not had any surgical pain.    Pain Scale Score	At rest: 0/10___ 	With Activity: ___ 	[X ] Refer to charted pain scores    THERAPY:    [ ] IV PCA Morphine		[ ] 5 mg/mL	[ ] 1 mg/mL  [X ] IV PCA Hydromorphone	[ ] 5 mg/mL	[X ] 1 mg/mL  [ ] IV PCA Fentanyl		[ ] 50 micrograms/mL    Demand dose __0.2_ lockout __6_ (minutes) Continuous Rate _0__ Total: _0.2__   mg used (in past 24 hrs)      MEDICATIONS  (STANDING):  acetaminophen  IVPB .. 1000 milliGRAM(s) IV Intermittent once  enalaprilat Injectable 2.5 milliGRAM(s) IV Push every 6 hours  enoxaparin Injectable 40 milliGRAM(s) SubCutaneous daily  ketorolac   Injectable 15 milliGRAM(s) IV Push every 6 hours  lactated ringers. 1000 milliLiter(s) (100 mL/Hr) IV Continuous <Continuous>  metoprolol tartrate Injectable 5 milliGRAM(s) IV Push every 6 hours  potassium chloride  10 mEq/100 mL IVPB 10 milliEquivalent(s) IV Intermittent every 1 hour    MEDICATIONS  (PRN):  HYDROmorphone  Injectable 0.25 milliGRAM(s) IV Push every 3 hours PRN Severe Pain (7 - 10)  naloxone Injectable 0.1 milliGRAM(s) IV Push every 3 minutes PRN For ANY of the following changes in patient status:  A. RR LESS THAN 10 breaths per minute, B. Oxygen saturation LESS THAN 90%, C. Sedation score of 6  ondansetron Injectable 4 milliGRAM(s) IV Push every 6 hours PRN Nausea      OBJECTIVE:  Patient sitting up in chair, and smiling.    Sedation Score:	[ X] Alert	[ ] Drowsy 	[ ] Arousable	[ ] Asleep	[ ] Unresponsive    Side Effects:	[X ] None	[ ] Nausea	[ ] Vomiting	[ ] Pruritus  		[ ] Other:    Vital Signs Last 24 Hrs  T(C): 37 (01 Nov 2019 09:55), Max: 37 (01 Nov 2019 09:55)  T(F): 98.6 (01 Nov 2019 09:55), Max: 98.6 (01 Nov 2019 09:55)  HR: 73 (01 Nov 2019 09:55) (69 - 75)  BP: 121/68 (01 Nov 2019 09:55) (106/72 - 121/68)  BP(mean): --  RR: 18 (01 Nov 2019 09:55) (15 - 19)  SpO2: 100% (01 Nov 2019 09:55) (95% - 100%)    ASSESSMENT/ PLAN    Therapy to  be:	[ ] Continue   [ X] Discontinued   [X ] Change to prn Analgesics    Documentation and Verification of current medications:   [X] Done	[ ] Not done, not elligible    Comments: PRN Oral/IV opioids and/or Adjuvant non-opioid medication to be ordered at this point.

## 2019-11-01 NOTE — PROGRESS NOTE ADULT - SUBJECTIVE AND OBJECTIVE BOX
Anesthesia Pain Management Service- Attending Addendum    SUBJECTIVE: Pt doing well with IV PCA without problems reported.    Therapy:	  [ X] IV PCA	   [ ] Epidural           [ ] s/p Spinal Opoid              [ ] Postpartum infusion	  [ ] Patient controlled regional anesthesia (PCRA)    [ ] prn Analgesics    Allergies    Celebrex (Nausea; Hives)  Demerol HCl (Nausea)    Intolerances      MEDICATIONS  (STANDING):  enalaprilat Injectable 2.5 milliGRAM(s) IV Push every 6 hours  enoxaparin Injectable 40 milliGRAM(s) SubCutaneous daily  ketorolac   Injectable 15 milliGRAM(s) IV Push every 6 hours  lactated ringers. 1000 milliLiter(s) (100 mL/Hr) IV Continuous <Continuous>  metoprolol tartrate Injectable 5 milliGRAM(s) IV Push every 6 hours    MEDICATIONS  (PRN):  HYDROmorphone  Injectable 0.25 milliGRAM(s) IV Push every 3 hours PRN Severe Pain (7 - 10)  naloxone Injectable 0.1 milliGRAM(s) IV Push every 3 minutes PRN For ANY of the following changes in patient status:  A. RR LESS THAN 10 breaths per minute, B. Oxygen saturation LESS THAN 90%, C. Sedation score of 6  ondansetron Injectable 4 milliGRAM(s) IV Push every 6 hours PRN Nausea      OBJECTIVE:   [X] No new signs     [ ] Other:    Side Effects:  [X ] None			[ ] Other:    Assessment of Catheter Site:		[ ] Intact		[ ] Other:    ASSESSMENT/PLAN  [ X] Continue current therapy    [ ] Therapy changed to:    [ ] IV PCA       [ ] Epidural     [ ] prn Analgesics     Comments:

## 2019-11-01 NOTE — PROGRESS NOTE ADULT - SUBJECTIVE AND OBJECTIVE BOX
A Team Surgery Progress Note     SUBJECTIVE / 24H EVENTS  Patient seen and examined on morning rounds. No acute events overnight. She reports passing flatus but no BM. Her pain is well controlled and she is in good spirits. She denies fevers, chills, nausea, or vomiting.     OBJECTIVE:    VITAL SIGNS:  T(C): 36.5 (19 @ 05:42), Max: 36.7 (10-31-19 @ 18:23)  HR: 74 (19 @ 05:42) (69 - 77)  BP: 117/61 (19 @ 05:42) (106/72 - 146/83)  RR: 19 (19 @ 05:42) (15 - 19)  SpO2: 97% (19 @ 05:42) (95% - 100%)    Daily     Daily         PHYSICAL EXAM:  Gen: NAD  LS: Respirations unlabored. CTA b/l  Card: RRR. No m/r/g.   GI: Soft, nontender, mildly distended, appropriate incisional tenderness, ANTHONY drain in LLQ dressings clean and dry and intact  Ext: Warm, well perfused      10-31-19 @ 07:01  -  19 @ 07:00  --------------------------------------------------------  IN:    IV PiggyBack: 650 mL    lactated ringers.: 2600 mL  Total IN: 3250 mL    OUT:    Indwelling Catheter - Urethral: 300 mL    Nasoenteral Tube: 900 mL    Voided: 650 mL  Total OUT: 1850 mL    Total NET: 1400 mL          LAB VALUES:  10-31    138  |  101  |  17  ----------------------------<  113<H>  3.8   |  23  |  0.76    Ca    8.4      31 Oct 2019 06:55  Phos  2.8     10-31  Mg     1.7     10-31    TPro  7.4  /  Alb  4.0  /  TBili  0.5  /  DBili  x   /  AST  29  /  ALT  17  /  AlkPhos  63  10-30                               11.2   14.26 )-----------( 218      ( 31 Oct 2019 06:55 )             35.4     LIVER FUNCTIONS - ( 30 Oct 2019 08:58 )  Alb: 4.0 g/dL / Pro: 7.4 g/dL / ALK PHOS: 63 u/L / ALT: 17 u/L / AST: 29 u/L / GGT: x           PT/INR - ( 30 Oct 2019 13:23 )   PT: 13.2 SEC;   INR: 1.18          PTT - ( 30 Oct 2019 13:23 )  PTT:27.2 SEC  ABG - ( 30 Oct 2019 21:47 )  pH, Arterial: 7.44  pH, Blood: x     /  pCO2: 35    /  pO2: 196   / HCO3: 24    / Base Excess: -0.5  /  SaO2: 97.9                  Urinalysis Basic - ( 30 Oct 2019 12:08 )    Color: LIGHT YELLOW / Appearance: CLEAR / S.029 / pH: 6.5  Gluc: NEGATIVE / Ketone: SMALL  / Bili: NEGATIVE / Urobili: NORMAL   Blood: SMALL / Protein: NEGATIVE / Nitrite: NEGATIVE   Leuk Esterase: NEGATIVE / RBC: 3-5 / WBC 0-2   Sq Epi: OCC / Non Sq Epi: x / Bacteria: NEGATIVE        MICROBIOLOGY:    No new microbiology data for review.     RADIOLOGY:    No new radiographic images for review.    MEDICATIONS  (STANDING):  enalaprilat Injectable 2.5 milliGRAM(s) IV Push every 6 hours  enoxaparin Injectable 40 milliGRAM(s) SubCutaneous daily  HYDROmorphone PCA (1 mG/mL) 30 milliLiter(s) PCA Continuous PCA Continuous  ketorolac   Injectable 15 milliGRAM(s) IV Push every 6 hours  lactated ringers. 1000 milliLiter(s) (100 mL/Hr) IV Continuous <Continuous>  metoprolol tartrate Injectable 5 milliGRAM(s) IV Push every 6 hours    MEDICATIONS  (PRN):  diphenhydrAMINE   Injectable 25 milliGRAM(s) IV Push every 4 hours PRN Pruritus  HYDROmorphone PCA (1 mG/mL) Rescue Clinician Bolus 0.5 milliGRAM(s) IV Push every 15 minutes PRN for Pain Scale GREATER THAN 6  naloxone Injectable 0.1 milliGRAM(s) IV Push every 3 minutes PRN For ANY of the following changes in patient status:  A. RR LESS THAN 10 breaths per minute, B. Oxygen saturation LESS THAN 90%, C. Sedation score of 6  ondansetron Injectable 4 milliGRAM(s) IV Push every 6 hours PRN Nausea

## 2019-11-01 NOTE — PROGRESS NOTE ADULT - ATTENDING COMMENTS
Patient seen and examined  She is doing well  Denies any nausea or vomiting  Passing flatus    Abd is soft, not tender and not distended  NGT clamp trial with minimal output, NGT removed    69 year old woman s/p ex-lap, RALPH, reduction of internal hernia  1. Clear liquid diet

## 2019-11-02 ENCOUNTER — TRANSCRIPTION ENCOUNTER (OUTPATIENT)
Age: 69
End: 2019-11-02

## 2019-11-02 VITALS
DIASTOLIC BLOOD PRESSURE: 86 MMHG | RESPIRATION RATE: 18 BRPM | SYSTOLIC BLOOD PRESSURE: 149 MMHG | HEART RATE: 70 BPM | TEMPERATURE: 98 F | OXYGEN SATURATION: 100 %

## 2019-11-02 LAB
ANION GAP SERPL CALC-SCNC: 11 MMO/L — SIGNIFICANT CHANGE UP (ref 7–14)
BUN SERPL-MCNC: 14 MG/DL — SIGNIFICANT CHANGE UP (ref 7–23)
CALCIUM SERPL-MCNC: 9 MG/DL — SIGNIFICANT CHANGE UP (ref 8.4–10.5)
CHLORIDE SERPL-SCNC: 100 MMOL/L — SIGNIFICANT CHANGE UP (ref 98–107)
CO2 SERPL-SCNC: 28 MMOL/L — SIGNIFICANT CHANGE UP (ref 22–31)
CREAT SERPL-MCNC: 0.69 MG/DL — SIGNIFICANT CHANGE UP (ref 0.5–1.3)
GLUCOSE SERPL-MCNC: 92 MG/DL — SIGNIFICANT CHANGE UP (ref 70–99)
HCT VFR BLD CALC: 33.5 % — LOW (ref 34.5–45)
HGB BLD-MCNC: 10.7 G/DL — LOW (ref 11.5–15.5)
MAGNESIUM SERPL-MCNC: 1.9 MG/DL — SIGNIFICANT CHANGE UP (ref 1.6–2.6)
MCHC RBC-ENTMCNC: 25.7 PG — LOW (ref 27–34)
MCHC RBC-ENTMCNC: 31.9 % — LOW (ref 32–36)
MCV RBC AUTO: 80.5 FL — SIGNIFICANT CHANGE UP (ref 80–100)
NRBC # FLD: 0 K/UL — SIGNIFICANT CHANGE UP (ref 0–0)
PHOSPHATE SERPL-MCNC: 2.2 MG/DL — LOW (ref 2.5–4.5)
PLATELET # BLD AUTO: 219 K/UL — SIGNIFICANT CHANGE UP (ref 150–400)
PMV BLD: 12.5 FL — SIGNIFICANT CHANGE UP (ref 7–13)
POTASSIUM SERPL-MCNC: 3.4 MMOL/L — LOW (ref 3.5–5.3)
POTASSIUM SERPL-SCNC: 3.4 MMOL/L — LOW (ref 3.5–5.3)
RBC # BLD: 4.16 M/UL — SIGNIFICANT CHANGE UP (ref 3.8–5.2)
RBC # FLD: 16.4 % — HIGH (ref 10.3–14.5)
SODIUM SERPL-SCNC: 139 MMOL/L — SIGNIFICANT CHANGE UP (ref 135–145)
WBC # BLD: 8.07 K/UL — SIGNIFICANT CHANGE UP (ref 3.8–10.5)
WBC # FLD AUTO: 8.07 K/UL — SIGNIFICANT CHANGE UP (ref 3.8–10.5)

## 2019-11-02 RX ORDER — SODIUM CHLORIDE 9 MG/ML
3 INJECTION INTRAMUSCULAR; INTRAVENOUS; SUBCUTANEOUS EVERY 8 HOURS
Refills: 0 | Status: DISCONTINUED | OUTPATIENT
Start: 2019-11-02 | End: 2019-11-02

## 2019-11-02 RX ORDER — ACETAMINOPHEN 500 MG
2 TABLET ORAL
Qty: 40 | Refills: 0
Start: 2019-11-02 | End: 2019-11-06

## 2019-11-02 RX ORDER — POTASSIUM CHLORIDE 20 MEQ
40 PACKET (EA) ORAL ONCE
Refills: 0 | Status: COMPLETED | OUTPATIENT
Start: 2019-11-02 | End: 2019-11-02

## 2019-11-02 RX ORDER — SODIUM,POTASSIUM PHOSPHATES 278-250MG
1 POWDER IN PACKET (EA) ORAL ONCE
Refills: 0 | Status: DISCONTINUED | OUTPATIENT
Start: 2019-11-02 | End: 2019-11-02

## 2019-11-02 RX ORDER — IBUPROFEN 200 MG
1 TABLET ORAL
Qty: 20 | Refills: 0
Start: 2019-11-02 | End: 2019-11-06

## 2019-11-02 RX ORDER — SODIUM,POTASSIUM PHOSPHATES 278-250MG
1 POWDER IN PACKET (EA) ORAL ONCE
Refills: 0 | Status: COMPLETED | OUTPATIENT
Start: 2019-11-02 | End: 2019-11-02

## 2019-11-02 RX ADMIN — Medication 2.5 MILLIGRAM(S): at 05:40

## 2019-11-02 RX ADMIN — Medication 1 PACKET(S): at 11:23

## 2019-11-02 RX ADMIN — Medication 40 MILLIEQUIVALENT(S): at 11:23

## 2019-11-02 RX ADMIN — Medication 5 MILLIGRAM(S): at 05:40

## 2019-11-02 NOTE — PROGRESS NOTE ADULT - REASON FOR ADMISSION
Closed Loop Small Bowel Obstruction

## 2019-11-02 NOTE — DISCHARGE NOTE PROVIDER - NSDCCPTREATMENT_GEN_ALL_CORE_FT
PRINCIPAL PROCEDURE  Procedure: Repair, internal hernia  Findings and Treatment: WOUND CARE: If you have staples they will be removed at follow up office visit. Please keep incisions clean and dry. Please do not Scrub or rub incisions. Do not use lotion or powder on incisions.  You may let water run over incisions, pat and dry.  Apply dry gauze and paper tape, change once daily.  BATHING: Please do not submerge wound underwater. You may shower and/or sponge bathe.  ACTIVITY: No heavy lifting anything more than 10-15lbs or straining. Otherwise, you may return to your usual level of physical activity. If you are taking narcotic pain medication (such as Percocet), do NOT drive a car, operate machinery or make important decisions.  DIET: Regular Diet  NOTIFY YOUR SURGEON IF: You have any bleeding that does not stop, any pus draining from your wound, any fever (over 100.4 F) or chills, persistent nausea/vomiting with inability to tolerate food or liquids, persistent diarrhea, or if your pain is not controlled on your discharge pain medications.  FOLLOW-UP:  1. Please call to make a follow-up appointment within one week of discharge   2. Please follow up with your primary care physician in one week regarding your hospitalization.        SECONDARY PROCEDURE  Procedure: Exploratory laparotomy for bowel obstruction  Findings and Treatment:

## 2019-11-02 NOTE — DISCHARGE NOTE PROVIDER - NSDCMRMEDTOKEN_GEN_ALL_CORE_FT
acetaminophen 325 mg oral tablet: 3 tab(s) orally every 6 hours, As Needed  amLODIPine 5 mg oral tablet: 1 tab(s) orally once a day (at bedtime)  aspirin 81 mg oral tablet: 1 tab(s) orally once a day, restart 1/14  Bystolic 10 mg oral tablet: 10 milligram(s) orally once a day (at bedtime)  docusate sodium 100 mg oral capsule: 1 cap(s) orally 2 times a day, As Needed  enoxaparin 40 mg/0.4 mL injectable solution: 40 milligram(s) subcutaneously once a day   ibuprofen 600 mg oral tablet: 1 tab(s) orally every 6 hours, As Needed  magnesium oxide 500 mg oral tablet: 1 tab(s) orally once a day (at bedtime)  metFORMIN 500 mg oral tablet: 1 tab(s) orally 2 times a day  oxyCODONE 5 mg oral tablet: 1 tab(s) orally every 4 to 6 hours, As Needed -Moderate Pain (4 - 6) MDD:6  ramipril 10 mg oral capsule: 10 milligram(s) orally 2 times a day  simvastatin 40 mg oral tablet: 1 tab(s) orally once a day (at bedtime)  spironolactone 25 mg oral tablet: 1  orally once a day  Vascepa 1 g oral capsule: 1  orally 2 times a day, last dose 1/2/19  Vitamin B12 1000 mcg oral tablet: 1 tab(s) orally once a day  Vitamin D3 5000 intl units oral tablet: 1  orally once a week  Zetia 10 mg oral tablet: 1 tab(s) orally once a day (at bedtime) acetaminophen 325 mg oral tablet: 2 tab(s) orally every 6 hours, As Needed   amLODIPine 5 mg oral tablet: 1 tab(s) orally once a day (at bedtime)  aspirin 81 mg oral tablet: 1 tab(s) orally once a day, restart 1/14  Bystolic 10 mg oral tablet: 10 milligram(s) orally once a day (at bedtime)  docusate sodium 100 mg oral capsule: 1 cap(s) orally 2 times a day, As Needed  enoxaparin 40 mg/0.4 mL injectable solution: 40 milligram(s) subcutaneously once a day   ibuprofen 600 mg oral tablet: 1 tab(s) orally every 6 hours, As Needed  magnesium oxide 500 mg oral tablet: 1 tab(s) orally once a day (at bedtime)  metFORMIN 500 mg oral tablet: 1 tab(s) orally 2 times a day  ramipril 10 mg oral capsule: 10 milligram(s) orally 2 times a day  simvastatin 40 mg oral tablet: 1 tab(s) orally once a day (at bedtime)  spironolactone 25 mg oral tablet: 1  orally once a day  Vascepa 1 g oral capsule: 1  orally 2 times a day, last dose 1/2/19  Vitamin B12 1000 mcg oral tablet: 1 tab(s) orally once a day  Vitamin D3 5000 intl units oral tablet: 1  orally once a week  Zetia 10 mg oral tablet: 1 tab(s) orally once a day (at bedtime)

## 2019-11-02 NOTE — PROGRESS NOTE ADULT - ATTENDING COMMENTS
Patient seen and examined  She is comfortable, denies nausea or vomiting  Passing flatus and had bowel movements    Plan for discharge to home today

## 2019-11-02 NOTE — DISCHARGE NOTE NURSING/CASE MANAGEMENT/SOCIAL WORK - NSDCPNDISPN_GEN_ALL_CORE
Education provided on the pain management plan of care/Side effects of pain management treatment/Activities of daily living, including home environment that might     exacerbate pain or reduce effectiveness of the pain management plan of care as well as strategies to address these issues/Safe use, storage and disposal of opioids when prescribed/Opioids not applicable/not prescribed

## 2019-11-02 NOTE — PROGRESS NOTE ADULT - ASSESSMENT
68 y/o woman with a medical history of HTN, HLD,Pre-DM, endometrial Cancer s/p CHRISS, PLND with abdominal flap on 1/2019 and breast cancer s/p bilateral mastectomy with TRAM flaps who presented with a closed loop bowel obstruction, now s/p from ex-lap, RALPH, and internal hernia repair (10/31/2019).       - Diet: Reg Diet  - DVT ppx  - OOB and ambulating as tolerated  - Replete labs as necessary  - Dc Home after tolerates diet      [facility] Team A Pager: #[phone]

## 2019-11-02 NOTE — DISCHARGE NOTE PROVIDER - NSDCCPCAREPLAN_GEN_ALL_CORE_FT
PRINCIPAL DISCHARGE DIAGNOSIS  Diagnosis: Obstruction concurrent with and due to internal hernia of abdomen  Assessment and Plan of Treatment:       SECONDARY DISCHARGE DIAGNOSES  Diagnosis: Hypophosphatemia  Assessment and Plan of Treatment: Repleted    Diagnosis: Hypokalemia  Assessment and Plan of Treatment: Repleted

## 2019-11-02 NOTE — PROGRESS NOTE ADULT - SUBJECTIVE AND OBJECTIVE BOX
A Team Surgery Progress Note     SUBJECTIVE / 24H EVENTS  Patient seen and examined on morning rounds. No acute events overnight. She reports passing flatus and BM. Her pain is well controlled and she is in good spirits. She denies fevers, chills, nausea, or vomiting. Ready to go home today      Vital Signs:  Vital Signs Last 24 Hrs  T(C): 36.4 (02 Nov 2019 05:39), Max: 37 (01 Nov 2019 09:55)  T(F): 97.5 (02 Nov 2019 05:39), Max: 98.6 (01 Nov 2019 09:55)  HR: 69 (02 Nov 2019 05:39) (68 - 75)  BP: 136/73 (02 Nov 2019 05:39) (113/69 - 153/78)  BP(mean): --  RR: 17 (02 Nov 2019 05:39) (17 - 18)  SpO2: 100% (02 Nov 2019 05:39) (98% - 100%)    CAPILLARY BLOOD GLUCOSE          I&O's Detail    01 Nov 2019 07:01  -  02 Nov 2019 07:00  --------------------------------------------------------  IN:    lactated ringers.: 600 mL    Oral Fluid: 480 mL  Total IN: 1080 mL    OUT:    Voided: 1550 mL  Total OUT: 1550 mL    Total NET: -470 mL          MEDICATIONS  (STANDING):  enalaprilat Injectable 2.5 milliGRAM(s) IV Push every 6 hours  enoxaparin Injectable 40 milliGRAM(s) SubCutaneous daily  metoprolol tartrate Injectable 5 milliGRAM(s) IV Push every 6 hours  potassium acid phosphate/sodium acid phosphate tablet (K-PHOS No. 2) 1 Tablet(s) Oral once  potassium phosphate / sodium phosphate powder 1 Packet(s) Oral once  sodium chloride 0.9% lock flush 3 milliLiter(s) IV Push every 8 hours    MEDICATIONS  (PRN):  HYDROmorphone  Injectable 0.25 milliGRAM(s) IV Push every 3 hours PRN Severe Pain (7 - 10)  naloxone Injectable 0.1 milliGRAM(s) IV Push every 3 minutes PRN For ANY of the following changes in patient status:  A. RR LESS THAN 10 breaths per minute, B. Oxygen saturation LESS THAN 90%, C. Sedation score of 6  ondansetron Injectable 4 milliGRAM(s) IV Push every 6 hours PRN Nausea      Physical Exam:  Gen: NAD.  Lungs: Non labored breathing.   Ab: Soft, nontender, nondistended.   Ext: Moves all 4 spontaneously.     Labs:    11-02    139  |  100  |  14  ----------------------------<  92  3.4<L>   |  28  |  0.69    Ca    9.0      02 Nov 2019 06:40  Phos  2.2     11-02  Mg     1.9     11-02                              10.7   8.07  )-----------( 219      ( 02 Nov 2019 06:40 )             33.5         Imaging:

## 2019-11-02 NOTE — DISCHARGE NOTE NURSING/CASE MANAGEMENT/SOCIAL WORK - PATIENT PORTAL LINK FT
You can access the FollowMyHealth Patient Portal offered by [facility] by registering at the following website: [email or web address]. By joining [facility]’s FollowMyHealth portal, you will also be able to view your health information using other applications (apps) compatible with our system.

## 2019-11-02 NOTE — DISCHARGE NOTE NURSING/CASE MANAGEMENT/SOCIAL WORK - NSDCPNINST_GEN_ALL_CORE
patient is to call if temperature above 101.4 call if pain or nausea increases .  may shower pat dry incision area do not rub.  call if any drainage noted from incision site .  no heavy lifting

## 2019-11-02 NOTE — DISCHARGE NOTE PROVIDER - CARE PROVIDER_API CALL
Deyvi Girard)  Surgery  [address], [address]  [place] [address]  Phone: [phone]  Fax: [phone]  Follow Up Time: 2 weeks

## 2019-11-02 NOTE — DISCHARGE NOTE PROVIDER - HOSPITAL COURSE
This is a 70 y/o woman with a medical history of HTN, HLD, Pre-DM,  endometrial Cancer s/p CHRISS, PLND with abdominal flap on 1/2019 and breast cancer s/p bilateral mastectomy with TRAM flaps who presents to the ED today with c/o worsening abdominal pain for the past 4 days. A CT scan was obtained which revealed a closed loop SBO.  Surgery was consulted for further evaluation. Pt underwent Ex lap, RALPH, closed loop internal hernia repair. Pt. tolerated procedure well and was transferred to the recovery room where pt was closely managed for postoperative pain and blood pressure control, and then transferred to the floor without incidence.  Pt. was mainly managed for postoperative pain, wound care, as well as close monitoring of fluid resuscitation, neurological status and electrolyte repletion.  Pt has been tolerating a diet, voiding, ambulating, and the pain is now well controlled.   Pt. is ready for discharge home in stable condition, and will follow up in two weeks as an outpatient with This is a 70 y/o woman with a medical history of HTN, HLD, Pre-DM,  endometrial Cancer s/p CHRISS, PLND with abdominal flap on 1/2019 and breast cancer s/p bilateral mastectomy with TRAM flaps who presents to the ED today with c/o worsening abdominal pain for the past 4 days. A CT scan was obtained which revealed a closed loop SBO.  Surgery was consulted for further evaluation. Pt underwent Ex lap, RALPH, closed loop internal hernia repair. Pt. tolerated procedure well and was transferred to the recovery room where pt was closely managed for postoperative pain and blood pressure control, and then transferred to the floor without incidence.  Pt. was mainly managed for postoperative pain with PCA which was removed, wound care, as well as close monitoring of fluid resuscitation, neurological status and electrolyte repletion. At time of discharge, the patient was hemodynamically stable, was tolerating PO diet, was voiding urine and passing stool, was ambulating, and was comfortable with adequate pain control. Pt stated she did not require narcotics to go home with. The patient was instructed to follow up with Dr. Girard within 1-2 weeks after discharge from the hospital. The patient/family felt comfortable with discharge. The patient was discharged to home. The patient had no other issues and was recovering appropriately.

## 2019-11-11 ENCOUNTER — APPOINTMENT (OUTPATIENT)
Dept: GYNECOLOGIC ONCOLOGY | Facility: CLINIC | Age: 69
End: 2019-11-11
Payer: MEDICARE

## 2019-11-11 ENCOUNTER — EMERGENCY (EMERGENCY)
Facility: HOSPITAL | Age: 69
LOS: 1 days | Discharge: HOME | End: 2019-11-11
Attending: EMERGENCY MEDICINE | Admitting: EMERGENCY MEDICINE
Payer: MEDICARE

## 2019-11-11 VITALS
DIASTOLIC BLOOD PRESSURE: 61 MMHG | RESPIRATION RATE: 18 BRPM | TEMPERATURE: 98 F | SYSTOLIC BLOOD PRESSURE: 121 MMHG | HEART RATE: 74 BPM | OXYGEN SATURATION: 99 %

## 2019-11-11 VITALS
WEIGHT: 159 LBS | BODY MASS INDEX: 28.17 KG/M2 | DIASTOLIC BLOOD PRESSURE: 77 MMHG | HEART RATE: 86 BPM | HEIGHT: 63 IN | SYSTOLIC BLOOD PRESSURE: 117 MMHG

## 2019-11-11 DIAGNOSIS — Z90.13 ACQUIRED ABSENCE OF BILATERAL BREASTS AND NIPPLES: Chronic | ICD-10-CM

## 2019-11-11 DIAGNOSIS — Z98.890 OTHER SPECIFIED POSTPROCEDURAL STATES: Chronic | ICD-10-CM

## 2019-11-11 DIAGNOSIS — Z98.891 HISTORY OF UTERINE SCAR FROM PREVIOUS SURGERY: Chronic | ICD-10-CM

## 2019-11-11 DIAGNOSIS — Z90.722 ACQUIRED ABSENCE OF OVARIES, BILATERAL: Chronic | ICD-10-CM

## 2019-11-11 PROCEDURE — 99213 OFFICE O/P EST LOW 20 MIN: CPT

## 2019-11-11 PROCEDURE — 99283 EMERGENCY DEPT VISIT LOW MDM: CPT | Mod: GC

## 2019-11-11 RX ADMIN — Medication 300 MILLIGRAM(S): at 22:41

## 2019-11-11 NOTE — ED PROVIDER NOTE - PATIENT PORTAL LINK FT
You can access the FollowMyHealth Patient Portal offered by [facility] by registering at the following website: [email or web address]. By joining [employer]’s FollowMyHealth portal, you will also be able to view your health information using other applications (apps) compatible with our system.

## 2019-11-11 NOTE — ED PROCEDURE NOTE - PROCEDURE ADDITIONAL DETAILS
Ultrasound guided visualization of right handed swelling from IV insertion site. No evidence of cobblestoning, abscess, or loculation on exam - likely interstitial edema 2/2 extravasation of IV site during hospital stay.

## 2019-11-11 NOTE — ED PROVIDER NOTE - NSFOLLOWUPINSTRUCTIONS_ED_ALL_ED_FT
You were seen and evaluated in the Emergency Department for your right hand abscess.     There was appreciable collection that we warrant an Incision or Drainage at this time.     At this time, clinical evaluation and history do not demonstrate any life-threatening injury that require further medical care.     Please take the antibiotics we prescribed (Clindamycin) as prescribed, you can pick it up at your pharmacy tomorrow - we gave you your first dose here in the ED.     Should you develop fevers, chills, nausea, vomiting, purulent drainage from your injury site, return to the ED immediately for further evaluation and treatment.     Follow up with your PMD within 48-72 hours for wound check. Any increased pain, redness, streaking (red lines), swelling, fever, chills return to ER.

## 2019-11-11 NOTE — ED PROVIDER NOTE - NS ED ROS FT
Constitution: No Fever or chills, No Weight Loss,   Eyes: No visual changes  HEENT: No cough, No Discharge, No Rhinorrhea, No URI symptoms  Cardio: No Chest pain, No Palpitations, No Dyspnea  Resp: No SOB, No Wheezing  GI: No abdominal pain, No Nausea, No Vomiting, No Constipation, No Diarrhea  : No burning upon urination, trouble urinating, no foul odor from urine  MSK: No Back pain, No Numbness, No Tingling, No Weakness  Neuro: No Headache, No changes to Vision, No changes to Hearing, Normal Gait  Skin: No rashes, No Bruising, No Swelling

## 2019-11-11 NOTE — ED PROVIDER NOTE - CHIEF COMPLAINT
The patient is a 69y Female complaining of The patient is a 69y Female complaining of right hand swelling.

## 2019-11-11 NOTE — ED PROVIDER NOTE - CLINICAL SUMMARY MEDICAL DECISION MAKING FREE TEXT BOX
62's female, presents with right hand swelling at IV insertion site from previous hospital admission. Ultrasound guided visualization of the lesion demonstrates no evidence of circumscribed collection of fluid, cobblestoning, or loculations. Likely interstitial edema, however given hospital acquired injury - will cover for possible developing infection. Skin otherwise looks non-cellulitic, non-tender on palpation, without any gross discharge.

## 2019-11-11 NOTE — ED PROVIDER NOTE - PHYSICAL EXAMINATION
GEN - NAD; well appearing; A+Ox3   HEAD - NC/AT, No visible Ecchymosis, No Abrasions, No Lacerations/Skin Tears     EYES - EOMI, PERRL, no conjunctival pallor, no scleral icterus  ENT -   mucous membranes  moist , no discharge      NECK - Neck supple, No LAD, No Swelling  PULM - CTA B/L,  symmetric breath sounds  COR -  RRR, S1 S2, no murmurs  ABD - NT/ND, soft, no guarding, no rebound, no masses    BACK - no CVA tenderness, nontender spine     EXTREMS - 0+ edema, no gross deformity, warm and well perfused    SKIN - (+) 3 cm swelling over right distal hand; no fluctuance appreciated, site is non-tender to palpation, non-erythematous; ultrasound guided visualization of the tissue demonstrates no discriminate fluid collection, loculations, or cobblestoning - likely interstitial edema from extravasation of IV site.

## 2019-11-11 NOTE — ED ADULT TRIAGE NOTE - CHIEF COMPLAINT QUOTE
S/P surgery double loop intestinal obstruction due to Hernia repaired, pt was D/C on 11/2 today coming with right lat. hand swelling/erythema noted.    pt stated had mult. IV to arm during her hosp.

## 2019-11-11 NOTE — ED PROCEDURE NOTE - ATTENDING CONTRIBUTION TO CARE
I performed a face-to-face evaluation of the patient and performed a history and physical examination. I agree with the history and physical examination.    Fadi: I was present during the critical part of the procedure.

## 2019-11-11 NOTE — ED PROVIDER NOTE - ATTENDING CONTRIBUTION TO CARE
I performed a face-to-face evaluation of the patient and performed a history and physical examination. I agree with the history and physical examination.    Recent admission. P/w red/hot/tender in R dorsal hand where had multiple needlesticks. POCUS showed no abscess, only edema c/w cellulitis. Dc on Clinda.

## 2019-11-11 NOTE — ED PROVIDER NOTE - SECONDARY DIAGNOSIS.
Cellulitis

## 2020-01-14 ENCOUNTER — OUTPATIENT (OUTPATIENT)
Dept: OUTPATIENT SERVICES | Facility: HOSPITAL | Age: 70
LOS: 1 days | End: 2020-01-14
Payer: MEDICARE

## 2020-01-14 ENCOUNTER — APPOINTMENT (OUTPATIENT)
Dept: RADIOLOGY | Facility: IMAGING CENTER | Age: 70
End: 2020-01-14
Payer: MEDICARE

## 2020-01-14 DIAGNOSIS — Z90.13 ACQUIRED ABSENCE OF BILATERAL BREASTS AND NIPPLES: Chronic | ICD-10-CM

## 2020-01-14 DIAGNOSIS — Z98.891 HISTORY OF UTERINE SCAR FROM PREVIOUS SURGERY: Chronic | ICD-10-CM

## 2020-01-14 DIAGNOSIS — Z98.890 OTHER SPECIFIED POSTPROCEDURAL STATES: Chronic | ICD-10-CM

## 2020-01-14 DIAGNOSIS — Z90.722 ACQUIRED ABSENCE OF OVARIES, BILATERAL: Chronic | ICD-10-CM

## 2020-01-14 DIAGNOSIS — Z00.8 ENCOUNTER FOR OTHER GENERAL EXAMINATION: ICD-10-CM

## 2020-01-14 PROCEDURE — 77080 DXA BONE DENSITY AXIAL: CPT

## 2020-01-14 PROCEDURE — 77080 DXA BONE DENSITY AXIAL: CPT | Mod: 26

## 2020-02-19 ENCOUNTER — APPOINTMENT (OUTPATIENT)
Dept: GYNECOLOGIC ONCOLOGY | Facility: CLINIC | Age: 70
End: 2020-02-19
Payer: MEDICARE

## 2020-02-19 VITALS
BODY MASS INDEX: 26.75 KG/M2 | DIASTOLIC BLOOD PRESSURE: 79 MMHG | SYSTOLIC BLOOD PRESSURE: 124 MMHG | HEART RATE: 77 BPM | WEIGHT: 151 LBS

## 2020-02-19 PROCEDURE — 99213 OFFICE O/P EST LOW 20 MIN: CPT

## 2020-06-05 ENCOUNTER — APPOINTMENT (OUTPATIENT)
Dept: GYNECOLOGIC ONCOLOGY | Facility: CLINIC | Age: 70
End: 2020-06-05
Payer: MEDICARE

## 2020-06-05 PROCEDURE — 99211 OFF/OP EST MAY X REQ PHY/QHP: CPT | Mod: 95

## 2020-07-27 NOTE — H&P PST ADULT - PULMONARY EMBOLUS
Gabapentin Pregnancy And Lactation Text: This medication is Pregnancy Category C and isn't considered safe during pregnancy. It is excreted in breast milk. no

## 2020-08-14 ENCOUNTER — APPOINTMENT (OUTPATIENT)
Dept: GYNECOLOGIC ONCOLOGY | Facility: CLINIC | Age: 70
End: 2020-08-14
Payer: MEDICARE

## 2020-08-14 VITALS
SYSTOLIC BLOOD PRESSURE: 126 MMHG | DIASTOLIC BLOOD PRESSURE: 70 MMHG | BODY MASS INDEX: 27.28 KG/M2 | OXYGEN SATURATION: 99 % | HEART RATE: 71 BPM | WEIGHT: 154 LBS

## 2020-08-14 PROCEDURE — 99213 OFFICE O/P EST LOW 20 MIN: CPT

## 2020-08-14 RX ORDER — AMLODIPINE BESYLATE 5 MG/1
TABLET ORAL
Refills: 0 | Status: DISCONTINUED | COMMUNITY
End: 2020-08-14

## 2020-11-16 ENCOUNTER — APPOINTMENT (OUTPATIENT)
Dept: GYNECOLOGIC ONCOLOGY | Facility: CLINIC | Age: 70
End: 2020-11-16
Payer: MEDICARE

## 2020-11-16 VITALS
HEIGHT: 63 IN | BODY MASS INDEX: 26.93 KG/M2 | WEIGHT: 152 LBS | HEART RATE: 73 BPM | DIASTOLIC BLOOD PRESSURE: 84 MMHG | SYSTOLIC BLOOD PRESSURE: 128 MMHG

## 2020-11-16 DIAGNOSIS — R39.15 URGENCY OF URINATION: ICD-10-CM

## 2020-11-16 PROCEDURE — 99214 OFFICE O/P EST MOD 30 MIN: CPT

## 2021-01-05 ENCOUNTER — NON-APPOINTMENT (OUTPATIENT)
Age: 71
End: 2021-01-05

## 2021-02-03 ENCOUNTER — NON-APPOINTMENT (OUTPATIENT)
Age: 71
End: 2021-02-03

## 2021-02-11 ENCOUNTER — APPOINTMENT (OUTPATIENT)
Dept: UROLOGY | Facility: CLINIC | Age: 71
End: 2021-02-11
Payer: MEDICARE

## 2021-02-11 VITALS
WEIGHT: 153 LBS | SYSTOLIC BLOOD PRESSURE: 137 MMHG | HEART RATE: 89 BPM | BODY MASS INDEX: 27.11 KG/M2 | DIASTOLIC BLOOD PRESSURE: 84 MMHG | TEMPERATURE: 97.6 F | HEIGHT: 63 IN

## 2021-02-11 DIAGNOSIS — R31.21 ASYMPTOMATIC MICROSCOPIC HEMATURIA: ICD-10-CM

## 2021-02-11 PROCEDURE — 99203 OFFICE O/P NEW LOW 30 MIN: CPT

## 2021-02-11 NOTE — REVIEW OF SYSTEMS
[Recent Weight Loss (___ Lbs)] : recent [unfilled] ~Ulb weight loss [Dry Eyes] : dryness of the eyes [Genital yeast infection] : genital yeast infection [Told you have blood in urine on a urine test] : told blood was present in a urine test [History of kidney stones] : history of kidney stones [Wake up at night to urinate  How many times?  ___] : wakes up to urinate [unfilled] times during the night [Leakage of urine with urgency] : leakage of urine with urgency [Negative] : Heme/Lymph

## 2021-02-14 LAB
APPEARANCE: CLEAR
BACTERIA: NEGATIVE
BILIRUBIN URINE: NEGATIVE
BLOOD URINE: NORMAL
COLOR: COLORLESS
GLUCOSE QUALITATIVE U: NEGATIVE
HYALINE CASTS: 0 /LPF
KETONES URINE: NEGATIVE
LEUKOCYTE ESTERASE URINE: NEGATIVE
MICROSCOPIC-UA: NORMAL
NITRITE URINE: NEGATIVE
PH URINE: 6.5
PROTEIN URINE: NEGATIVE
RED BLOOD CELLS URINE: 0 /HPF
SPECIFIC GRAVITY URINE: 1
SQUAMOUS EPITHELIAL CELLS: 0 /HPF
UROBILINOGEN URINE: NORMAL
WHITE BLOOD CELLS URINE: 0 /HPF

## 2021-02-16 ENCOUNTER — NON-APPOINTMENT (OUTPATIENT)
Age: 71
End: 2021-02-16

## 2021-02-22 ENCOUNTER — APPOINTMENT (OUTPATIENT)
Dept: GYNECOLOGIC ONCOLOGY | Facility: CLINIC | Age: 71
End: 2021-02-22
Payer: MEDICARE

## 2021-02-22 VITALS
HEART RATE: 92 BPM | HEIGHT: 63 IN | DIASTOLIC BLOOD PRESSURE: 84 MMHG | SYSTOLIC BLOOD PRESSURE: 149 MMHG | BODY MASS INDEX: 26.93 KG/M2 | WEIGHT: 152 LBS

## 2021-02-22 PROCEDURE — 99214 OFFICE O/P EST MOD 30 MIN: CPT

## 2021-07-07 NOTE — ED PROVIDER NOTE - OBJECTIVE STATEMENT
Pigmentary glaucoma of both eyes, moderate stage  IOP is stable. Continue Latanoprost at bedtime in both eyes. Will have patient back in 4 months a pressure check. Pt is a 69 yof, recently hospitalized for abdominal surgery. Presents with right hand swelling and pain, at site of prior IV insertion. Pt denies any fevers, chills, nausea, vomiting, abdominal pain. Denies any discharge from IV insertion site, no erythema.

## 2021-09-03 ENCOUNTER — NON-APPOINTMENT (OUTPATIENT)
Age: 71
End: 2021-09-03

## 2021-09-13 ENCOUNTER — APPOINTMENT (OUTPATIENT)
Dept: GYNECOLOGIC ONCOLOGY | Facility: CLINIC | Age: 71
End: 2021-09-13
Payer: MEDICARE

## 2021-09-13 VITALS
SYSTOLIC BLOOD PRESSURE: 130 MMHG | BODY MASS INDEX: 28.17 KG/M2 | WEIGHT: 159 LBS | HEART RATE: 99 BPM | HEIGHT: 63 IN | DIASTOLIC BLOOD PRESSURE: 82 MMHG

## 2021-09-13 DIAGNOSIS — R31.29 OTHER MICROSCOPIC HEMATURIA: ICD-10-CM

## 2021-09-13 PROCEDURE — 99213 OFFICE O/P EST LOW 20 MIN: CPT

## 2022-03-08 NOTE — ASU PREOP CHECKLIST - TYPE OF SOLUTION
Left Voicemail for patient to call back and r/s their appointment with Macie Maldonado at [facility]  If patient is a BREAST PATIENT:   -they can see George Delong at [facility] or Macie Maldonado at hospitals     If patient is a NONBREAST PATIENT:   -they can see Lesly at [facility] or George Delong at hospitals on Fridays  lr

## 2022-03-26 ENCOUNTER — NON-APPOINTMENT (OUTPATIENT)
Age: 72
End: 2022-03-26

## 2022-03-28 ENCOUNTER — APPOINTMENT (OUTPATIENT)
Dept: GYNECOLOGIC ONCOLOGY | Facility: CLINIC | Age: 72
End: 2022-03-28
Payer: MEDICARE

## 2022-03-28 VITALS
WEIGHT: 166 LBS | SYSTOLIC BLOOD PRESSURE: 123 MMHG | HEIGHT: 63 IN | BODY MASS INDEX: 29.41 KG/M2 | HEART RATE: 80 BPM | DIASTOLIC BLOOD PRESSURE: 85 MMHG

## 2022-03-28 PROCEDURE — 99213 OFFICE O/P EST LOW 20 MIN: CPT

## 2022-03-31 ENCOUNTER — APPOINTMENT (OUTPATIENT)
Dept: RADIOLOGY | Facility: IMAGING CENTER | Age: 72
End: 2022-03-31
Payer: MEDICARE

## 2022-03-31 ENCOUNTER — OUTPATIENT (OUTPATIENT)
Dept: OUTPATIENT SERVICES | Facility: HOSPITAL | Age: 72
LOS: 1 days | End: 2022-03-31
Payer: MEDICARE

## 2022-03-31 DIAGNOSIS — Z00.8 ENCOUNTER FOR OTHER GENERAL EXAMINATION: ICD-10-CM

## 2022-03-31 DIAGNOSIS — Z90.722 ACQUIRED ABSENCE OF OVARIES, BILATERAL: Chronic | ICD-10-CM

## 2022-03-31 DIAGNOSIS — Z90.13 ACQUIRED ABSENCE OF BILATERAL BREASTS AND NIPPLES: Chronic | ICD-10-CM

## 2022-03-31 DIAGNOSIS — Z98.890 OTHER SPECIFIED POSTPROCEDURAL STATES: Chronic | ICD-10-CM

## 2022-03-31 DIAGNOSIS — Z98.891 HISTORY OF UTERINE SCAR FROM PREVIOUS SURGERY: Chronic | ICD-10-CM

## 2022-03-31 PROCEDURE — 77080 DXA BONE DENSITY AXIAL: CPT | Mod: 26

## 2022-03-31 PROCEDURE — 77080 DXA BONE DENSITY AXIAL: CPT

## 2022-10-17 ENCOUNTER — APPOINTMENT (OUTPATIENT)
Dept: GYNECOLOGIC ONCOLOGY | Facility: CLINIC | Age: 72
End: 2022-10-17

## 2022-10-17 VITALS
HEART RATE: 78 BPM | SYSTOLIC BLOOD PRESSURE: 117 MMHG | BODY MASS INDEX: 29.77 KG/M2 | HEIGHT: 63 IN | WEIGHT: 168 LBS | DIASTOLIC BLOOD PRESSURE: 73 MMHG

## 2022-10-17 PROCEDURE — 99213 OFFICE O/P EST LOW 20 MIN: CPT

## 2022-10-17 RX ORDER — SPIRONOLACTONE 50 MG/1
TABLET ORAL
Refills: 0 | Status: DISCONTINUED | COMMUNITY
End: 2022-10-17

## 2022-10-17 RX ORDER — CHOLECALCIFEROL (VITAMIN D3) 25 MCG
TABLET ORAL
Refills: 0 | Status: DISCONTINUED | COMMUNITY
End: 2022-10-17

## 2022-10-17 RX ORDER — EZETIMIBE 10 MG/1
TABLET ORAL
Refills: 0 | Status: DISCONTINUED | COMMUNITY
End: 2022-10-17

## 2022-10-17 RX ORDER — SIMVASTATIN 80 MG/1
TABLET, FILM COATED ORAL
Refills: 0 | Status: DISCONTINUED | COMMUNITY
End: 2022-10-17

## 2023-03-20 ENCOUNTER — APPOINTMENT (OUTPATIENT)
Dept: GYNECOLOGIC ONCOLOGY | Facility: CLINIC | Age: 73
End: 2023-03-20

## 2023-04-10 ENCOUNTER — APPOINTMENT (OUTPATIENT)
Dept: GYNECOLOGIC ONCOLOGY | Facility: CLINIC | Age: 73
End: 2023-04-10
Payer: MEDICARE

## 2023-04-10 VITALS
WEIGHT: 169 LBS | DIASTOLIC BLOOD PRESSURE: 77 MMHG | SYSTOLIC BLOOD PRESSURE: 132 MMHG | BODY MASS INDEX: 31.1 KG/M2 | HEIGHT: 62 IN | HEART RATE: 85 BPM

## 2023-04-10 PROCEDURE — 99213 OFFICE O/P EST LOW 20 MIN: CPT

## 2023-06-20 NOTE — ASU PREOP CHECKLIST - HEIGHT IN FEET
5 Detail Level: Zone Detail Level: Detailed Detail Level: Simple Sunscreen Recommendations: SPF 50 or higher, applied daily or hourly when heavy sun exposure is anticipated Nicotinamide Supplementation Recommendations: Nicotinamide is purchased over-the-counter in 500 mg capsules.  Nicotinamide should be taken as one 500 mg capsule twice a day. Supplementation with Nicotinamide does not replace sunscreen application.

## 2023-07-22 NOTE — H&P PST ADULT - LYMPH NODES
[facility]     Postpartum clots  You have difficult going to the bathroom  Cracks, blisters, or blood on your nipples  Redness, discharge, or incision pain worse than you had in the hospital.  Burning or pain when you urinate  Nausea or vomiting  Abdominal pain that isn’t relieved with medication  Dizziness or fainting  No bowel movement for 5 days  You have symptoms of postpartum depression (loss of appetite, feelings of hopelessness or loss of control, inability to sleep or extensive sleep).      Understanding Postpartum Depression  You’ve just had a baby. You know you should be excited and happy. But instead you find yourself crying for no reason. You may have trouble coping with your daily tasks. You feel sad, tired, and hopeless most of the time. You may even feel ashamed or guilty. But what you’re going through is not your fault and you can feel better. Talk to your doctor. He or she can help.    What Is Depression?  Depression is a mood disorder that affects the way you think and feel. The most common symptom is a feeling of deep sadness. You may also feel as if you just can’t cope with life. Other symptoms include:  Gaining or losing a lot of weight  Sleeping too much or too little  Feeling tired all the time  Feeling restless  Fears of harming your baby  Lack of interest in your baby  Feeling worthless or guilty  No longer finding pleasure in things you used to  Having trouble thinking clearly or making decisions  Thinking about death or suicide       Follow-Up Care:  Your next appointment with your midwife should be scheduled for 4-6 weeks postpartum.  There are Lactation consultants available in the Midwifery clinic, if you have any questions or concerns about breast feeding, please call the clinic RN line at [phone] to schedule a visit or phone call.  If you feel that you need to be seen sooner, please do not hesitate to call.    Special Instructions:   Please call with any signs and symptoms of preeclampsia immediately -  Headache that will not go away, changes in your vision, upper abdominal pain, swelling in the face or significant swelling in the legs, chest pain or shortness of breath.        Again, congratulations from the [facility]!     Congratulations again on the birth of your baby!     The first six weeks following your delivery are known as the postpartum period.  Here are some general instructions to help both you and your baby have a healthy and happy postpartum recovery.      Rest & Sleep:  Focus on yourself and baby during this time, and get plenty of rest for the first few weeks.    Sleep when your baby sleeps and allow support people help you rest (care for other children, prepare meals, shopping, cleaning, etc).  Do not lift anything heavier than your baby.  Take short walks if possible throughout the day.    Nutrition:  Eat nutritious and well balanced meals to provide your body the energy it needs.  Drink at least 8 glasses of water every day (try drinking a glass of water every time you feed the baby).  Do not diet at this time.  Breastfeeding mothers require extra fluid, calories, protein and calcium.   Avoid tobacco, alcohol and non-essential medications when breastfeeding.     Postpartum Bleeding (Lochia):  Expect bleeding for up to 6 weeks.  Expect normal period odor from your bleeding.  Change your sanitary pad often, and wash your hands before changing.  DO NOT have intercourse, use tampons, or place anything in your vagina for 6-8 weeks to allow your body time to heal.    Call your doctor/midwife for foul smelling vaginal discharge, large clots, or heavy bleeding (saturating a pad in an hour).     Care of the Perineum after Vaginal Birth  Expect some swelling and tenderness for several days, especially if a tear occurred.    Use squirt bottle after urination and bowel movements  After urinating, pat dry but do not wipe.  Ice packs, Tucks pads, and/or numbing spray (Dermoplast) can be  used for discomfort.  Breast Care for Pumping Mothers:   Wear a supportive bra.  Pump on schedule, every 2-3 hours or 8-10 times per day, to encourage/maintain supply.   Clean pump parts with warm water and dish soap after each pumping session, allow parts to air dry on clean paper towel.   Prevent sore nipples by ensuring proper flange fit and rubbing your expressed breast milk on your nipples after every pumping session.   Plugged ducts are sore hard rock-like areas in the substance of the breast that can be treated by taking a warm shower, nursing your baby by positioning chin toward blockage, and massaging area toward nipple while pumping.  Mastitis is a breast infection that may include the following symptoms: fever; red, very sore area of breast; flu-like symptoms. Call your doctor/midwife right away if these symptoms occur    Don't hesitate to reach out to the number listed at the bottom of these instructions for additional lactation assistance and support.    Postpartum Family Planning:   Continue discussions with your provider at your follow up visit.    Postpartum Adjustment:   Becoming a mother involves many changes to your mind and body. Although you may have expected to be excited and happy, instead you may be unsure of yourself in your new role, and you may find that you are easily upset, impatient, irritable or tearful. This is normal and comes and goes quickly.  \"Baby blues\" may occur anywhere from a few days after delivery to several weeks postpartum and will pass in a short time.     Postpartum Depression:  Postpartum depression goes beyond \"baby blues\" and is persistent, intense, and severe.  The most common symptom is a feeling of deep sadness.  You may also feel as if you just can't cope with life.  Other symptoms include:  thoughts of harming yourself or the baby  lack of interest in the baby, family, or friends  feeling guilty or worthless  feeling hopeless  uncontrollable crying  feelings of  being a bad mother  trouble sleeping, oversleeping or feeling tired all the time  changes in appetite  strong feelings of irritability, anger, or nervousness  having trouble thinking clearly or making decisions  having headaches, aches and pains, or stomach problems that won't go away  thinking about death or suicide    The exact cause of postpartum depression is unknown. Changes in brain chemistry or structure are believed to play a big role in depression. It may be due to changes in your hormones during and after childbirth. You may also be tired from caring for your baby and adjusting to being a mother. All these factors may make you feel depressed. In some cases, your genes may also play a role.    Postpartum depression can be treated in many ways.  Talking to your healthcare provider is the first step toward feeling better.    Call your doctor or midwife immediately if you:  Cry for no clear reason  Have trouble sleeping, eating, and making choices  Question whether you can handle caring for your baby  Have intense feelings of sadness, anxiety, or despair that prevent you from being able to do your daily tasks    Here are some additional resources offering support for Postpartum Depression:    Postpartum Support International: (332) 578-5951   Mom's Mental Health Initiative ([facility]) https://momsmentalhealthmke.org/  Mental Health [facility] (168) 075-2019 or (742) 463-2654   National [place] for Mental Health: (679) 206-9764  National Suicide Prevention (394) 961-8659  Postpartum Progress https://postpartumprogress.com/     Postpartum Preeclampsia: A serious condition that occurs when a woman has high blood pressure and excess protein in her urine soon after childbirth. It usually occurs within a few days of birth, but can develop up to 6 weeks after having the baby. Preeclampsia can result in seizures and other serious complications and requires prompt treatment.     Call your  doctor or midwife [phone].  For lactation support contact [phone].    Our Hospital and Medical team have enjoyed caring for you during this special time, and we wish you a safe  and healthy recovery.            No lymphadedenopathy

## 2023-09-23 NOTE — ASU PREOP CHECKLIST - CHLOROHEXIDINE WASH IN ASU
Patient presents with ongoing UTI. She was treated with Bactrim as outpatient without improvement. Plan was for outpatient management with Cipro (as per ID) but patient did not start   Nephrology recommended admission to hospital for management with IV abx  Given history, starting patient on meropenem.   Day team to consult ID  Concern for infected ureteral stent    I have ordered urine culture  Continue iv meropenem for now  CT abd showed mild right hydronephrosis and ureteral stent extends from the renal pelvis into the urinary bladder.  Cr trending down.     Discussed with infectious disease, will continue close monitoring, I have ordered EKG to check for QTc prolongation and consideration for fluoroquinolones treatment.     10-Antonio-2019 07:00

## 2023-10-16 ENCOUNTER — NON-APPOINTMENT (OUTPATIENT)
Age: 73
End: 2023-10-16

## 2023-10-30 ENCOUNTER — APPOINTMENT (OUTPATIENT)
Dept: GYNECOLOGIC ONCOLOGY | Facility: CLINIC | Age: 73
End: 2023-10-30
Payer: MEDICARE

## 2023-10-30 VITALS
HEART RATE: 82 BPM | SYSTOLIC BLOOD PRESSURE: 133 MMHG | BODY MASS INDEX: 32.57 KG/M2 | DIASTOLIC BLOOD PRESSURE: 83 MMHG | HEIGHT: 62 IN | WEIGHT: 177 LBS

## 2023-10-30 PROCEDURE — 99213 OFFICE O/P EST LOW 20 MIN: CPT

## 2023-10-30 RX ORDER — AMLODIPINE BESYLATE 5 MG/1
TABLET ORAL
Refills: 0 | Status: ACTIVE | COMMUNITY

## 2023-10-30 RX ORDER — CHROMIUM 200 MCG
TABLET ORAL
Refills: 0 | Status: ACTIVE | COMMUNITY

## 2023-10-30 RX ORDER — SACUBITRIL AND VALSARTAN 49; 51 MG/1; MG/1
TABLET, FILM COATED ORAL
Refills: 0 | Status: ACTIVE | COMMUNITY

## 2023-10-30 RX ORDER — SIMVASTATIN 20 MG/1
20 TABLET, FILM COATED ORAL
Refills: 0 | Status: ACTIVE | COMMUNITY

## 2023-10-30 RX ORDER — EZETIMIBE 10 MG/1
10 TABLET ORAL
Refills: 0 | Status: ACTIVE | COMMUNITY

## 2023-10-30 RX ORDER — MAGNESIUM HYDROXIDE 400 MG/5ML
SUSPENSION, ORAL (FINAL DOSE FORM) ORAL
Refills: 0 | Status: ACTIVE | COMMUNITY

## 2024-01-17 ENCOUNTER — NON-APPOINTMENT (OUTPATIENT)
Age: 74
End: 2024-01-17

## 2024-04-01 PROBLEM — C54.1 CANCER OF ENDOMETRIUM: Status: ACTIVE | Noted: 2018-10-10

## 2024-04-08 ENCOUNTER — APPOINTMENT (OUTPATIENT)
Dept: GYNECOLOGIC ONCOLOGY | Facility: CLINIC | Age: 74
End: 2024-04-08
Payer: MEDICARE

## 2024-04-08 VITALS
HEART RATE: 74 BPM | SYSTOLIC BLOOD PRESSURE: 117 MMHG | BODY MASS INDEX: 33.13 KG/M2 | DIASTOLIC BLOOD PRESSURE: 77 MMHG | WEIGHT: 180 LBS | HEIGHT: 62 IN

## 2024-04-08 DIAGNOSIS — C54.1 MALIGNANT NEOPLASM OF ENDOMETRIUM: ICD-10-CM

## 2024-04-08 DIAGNOSIS — Z15.09 GENETIC SUSCEPTIBILITY TO MALIGNANT NEOPLASM OF BREAST: ICD-10-CM

## 2024-04-08 DIAGNOSIS — Z15.01 GENETIC SUSCEPTIBILITY TO MALIGNANT NEOPLASM OF BREAST: ICD-10-CM

## 2024-04-08 DIAGNOSIS — M85.80 OTHER SPECIFIED DISORDERS OF BONE DENSITY AND STRUCTURE, UNSPECIFIED SITE: ICD-10-CM

## 2024-04-08 PROCEDURE — 99213 OFFICE O/P EST LOW 20 MIN: CPT

## 2024-04-08 NOTE — PHYSICAL EXAM
[Chaperone Present] : A chaperone was present in the examining room during all aspects of the physical examination [Abnormal] : Examination of extremities for edema and/or varicosities: Abnormal [Absent] : Adnexa(ae): Absent [Normal] : Recto-Vaginal Exam: Normal [FreeTextEntry1] : AP [de-identified] : trace edema [de-identified] : Scars Vert and Trans CDI [de-identified] : atrophy

## 2024-04-08 NOTE — HISTORY OF PRESENT ILLNESS
[FreeTextEntry1] : Stage IA Grade 1 EM Ca CHRISS, BSO, LNS, Abdominoplasty (Nathaly) - 1/10/19 Referring MD (GYN) Dr. Minerva Zamora PCP: Dr. Masters Cardiologist: Dr. Garcia Dermatologist: Dr. Gambino Med/Onc: Dr. Narayanan Urology: Dr. YASMIN Vega Breast: Dr Jauregui  Genetics: BRCA2 Pos.   She RTO feeling well and noting no VB, VD, or pain. She reported no GI or  concerns.   [facility] She notes having seen DD, who she confirms is aware of her BRCA status.  DEXA: Mar 2022 - osteopenia.  CCS: She reports a neg cologuard in Jan 2020 (JAZMIN)

## 2024-04-08 NOTE — DISCUSSION/SUMMARY
[Reviewed Clinical Lab Test(s)] : Results of clinical tests were reviewed. [Reviewed Radiology Report(s)] : Radiology reports were reviewed. [FreeTextEntry1] : She remains clinically MARQUES. -We discussed her plan of care and surveillance.   -We discussed her BHM, and her f/u with DD. We disc her reviewing the implications of her BRCA status at the next visit which she says she has.   -Bone health and her DEXA (osteopenia) has been discussed, incl Vit D and Ca supplementation, as well as being active.  Rx for f/u study provided.  -Her instructions were reviewed, incl sx to watch for. -All Q/A. -She'll RTO in 6m.

## 2024-04-30 NOTE — DISCHARGE NOTE NURSING/CASE MANAGEMENT/SOCIAL WORK - NSDCPETBCESMAN_GEN_ALL_CORE
Chief Complaints and History of Present Illnesses   Patient presents with    Cataract     Chief Complaint(s) and History of Present Illness(es)       Cataract              Laterality: both eyes    Duration: 1 year              Comments    Pt. States that VA has been worsening BE over the last year. Started having issues with driving in the dark this last fall. No pain BE. No dryness BE. No flashes BE, some floaters BE.   Nelli Espinosa COT 2:18 PM April 30, 2024                       If you are a smoker, it is important for your health to stop smoking. Please be aware that second hand smoke is also harmful.

## 2024-06-11 ENCOUNTER — APPOINTMENT (OUTPATIENT)
Dept: RADIOLOGY | Facility: IMAGING CENTER | Age: 74
End: 2024-06-11
Payer: MEDICARE

## 2024-06-11 ENCOUNTER — OUTPATIENT (OUTPATIENT)
Dept: OUTPATIENT SERVICES | Facility: HOSPITAL | Age: 74
LOS: 1 days | End: 2024-06-11
Payer: MEDICARE

## 2024-06-11 DIAGNOSIS — Z98.890 OTHER SPECIFIED POSTPROCEDURAL STATES: Chronic | ICD-10-CM

## 2024-06-11 DIAGNOSIS — Z98.891 HISTORY OF UTERINE SCAR FROM PREVIOUS SURGERY: Chronic | ICD-10-CM

## 2024-06-11 DIAGNOSIS — Z90.722 ACQUIRED ABSENCE OF OVARIES, BILATERAL: Chronic | ICD-10-CM

## 2024-06-11 DIAGNOSIS — M85.80 OTHER SPECIFIED DISORDERS OF BONE DENSITY AND STRUCTURE, UNSPECIFIED SITE: ICD-10-CM

## 2024-06-11 DIAGNOSIS — Z90.13 ACQUIRED ABSENCE OF BILATERAL BREASTS AND NIPPLES: Chronic | ICD-10-CM

## 2024-06-11 PROCEDURE — 77080 DXA BONE DENSITY AXIAL: CPT

## 2024-06-11 PROCEDURE — 77080 DXA BONE DENSITY AXIAL: CPT | Mod: 26

## 2024-10-07 ENCOUNTER — NON-APPOINTMENT (OUTPATIENT)
Age: 74
End: 2024-10-07

## 2024-10-07 ENCOUNTER — APPOINTMENT (OUTPATIENT)
Dept: GYNECOLOGIC ONCOLOGY | Facility: CLINIC | Age: 74
End: 2024-10-07
Payer: MEDICARE

## 2024-10-07 VITALS
BODY MASS INDEX: 33.68 KG/M2 | WEIGHT: 183 LBS | HEART RATE: 70 BPM | SYSTOLIC BLOOD PRESSURE: 110 MMHG | HEIGHT: 62 IN | TEMPERATURE: 97.3 F | DIASTOLIC BLOOD PRESSURE: 76 MMHG | OXYGEN SATURATION: 93 %

## 2024-10-07 DIAGNOSIS — C54.1 MALIGNANT NEOPLASM OF ENDOMETRIUM: ICD-10-CM

## 2024-10-07 DIAGNOSIS — M85.80 OTHER SPECIFIED DISORDERS OF BONE DENSITY AND STRUCTURE, UNSPECIFIED SITE: ICD-10-CM

## 2024-10-07 DIAGNOSIS — R10.9 UNSPECIFIED ABDOMINAL PAIN: ICD-10-CM

## 2024-10-07 PROCEDURE — 99459 PELVIC EXAMINATION: CPT

## 2024-10-07 PROCEDURE — 99214 OFFICE O/P EST MOD 30 MIN: CPT

## 2024-10-07 RX ORDER — SPIRONOLACTONE 25 MG/1
25 TABLET ORAL
Refills: 0 | Status: ACTIVE | COMMUNITY

## 2024-10-07 RX ORDER — SENNOSIDES 8.6 MG
TABLET ORAL
Refills: 0 | Status: ACTIVE | COMMUNITY

## 2024-10-08 ENCOUNTER — OUTPATIENT (OUTPATIENT)
Dept: OUTPATIENT SERVICES | Facility: HOSPITAL | Age: 74
LOS: 1 days | End: 2024-10-08
Payer: MEDICARE

## 2024-10-08 ENCOUNTER — APPOINTMENT (OUTPATIENT)
Dept: CT IMAGING | Facility: IMAGING CENTER | Age: 74
End: 2024-10-08
Payer: MEDICARE

## 2024-10-08 DIAGNOSIS — Z98.891 HISTORY OF UTERINE SCAR FROM PREVIOUS SURGERY: Chronic | ICD-10-CM

## 2024-10-08 DIAGNOSIS — Z98.890 OTHER SPECIFIED POSTPROCEDURAL STATES: Chronic | ICD-10-CM

## 2024-10-08 DIAGNOSIS — Z90.722 ACQUIRED ABSENCE OF OVARIES, BILATERAL: Chronic | ICD-10-CM

## 2024-10-08 DIAGNOSIS — Z90.13 ACQUIRED ABSENCE OF BILATERAL BREASTS AND NIPPLES: Chronic | ICD-10-CM

## 2024-10-08 DIAGNOSIS — Z00.8 ENCOUNTER FOR OTHER GENERAL EXAMINATION: ICD-10-CM

## 2024-10-08 PROCEDURE — 74177 CT ABD & PELVIS W/CONTRAST: CPT | Mod: 26,MH

## 2024-10-10 ENCOUNTER — TRANSCRIPTION ENCOUNTER (OUTPATIENT)
Age: 74
End: 2024-10-10

## 2024-10-22 ENCOUNTER — TRANSCRIPTION ENCOUNTER (OUTPATIENT)
Age: 74
End: 2024-10-22

## 2024-10-30 NOTE — STUDENT SIGN OFF DOCUMENT - DOCUMENTS STUDENTS ARE SIGNED OFF ON
Chief Complaint   Patient presents with    Thyroid Problem     Follow up        Vital Signs benefits of rx, proposed eval, and expected follow up discussed with family and all questions answered  Follow up in 3 months or sooner if any concerns    As above.    Patient Instructions   Seen for follow up    Plan:  Continue the current dose of methimazole  Would send some labs today to be done in 6 weeks  Would contact family with results and further management plan  We reviewed the risk of agranulocytosis(low blood count) and the need to stop methimazole and get an emergency CBC to look for this with any fever above 100.5F or with severe sore throat.       Orders Placed This Encounter   Procedures    TSH     Standing Status:   Future     Standing Expiration Date:   3/12/2025    T4, Free     Standing Status:   Future     Standing Expiration Date:   3/12/2025    T3     Standing Status:   Future     Standing Expiration Date:   3/12/2025       Total time: 30minutes  Time spent counseling patient/family: 50%    Parts of these notes were done by Dragon dictation and may be subject to inadvertent grammatical errors due to issues of voice recognition.    Diego Ash MD

## 2024-11-20 PROCEDURE — 74177 CT ABD & PELVIS W/CONTRAST: CPT

## 2025-05-20 NOTE — ASU PATIENT PROFILE, ADULT - BARIATRIC
Per patient/guardian consent on date of visit, message left on number provided containing results.   ----- Message from YONY Pang sent at 5/20/2025  8:53 AM CDT -----  Please assure the patient that the CBC, sed rate, CMP and uric acid that we checked are completely normal.  Continue his treatment plan.  
no